# Patient Record
Sex: MALE | Race: WHITE
[De-identification: names, ages, dates, MRNs, and addresses within clinical notes are randomized per-mention and may not be internally consistent; named-entity substitution may affect disease eponyms.]

---

## 2018-03-08 ENCOUNTER — HOSPITAL ENCOUNTER (OUTPATIENT)
Dept: HOSPITAL 62 - END | Age: 78
Discharge: HOME | End: 2018-03-08
Attending: SURGERY
Payer: MEDICARE

## 2018-03-08 VITALS — SYSTOLIC BLOOD PRESSURE: 134 MMHG | DIASTOLIC BLOOD PRESSURE: 70 MMHG

## 2018-03-08 DIAGNOSIS — K63.5: ICD-10-CM

## 2018-03-08 DIAGNOSIS — D12.2: ICD-10-CM

## 2018-03-08 DIAGNOSIS — K57.30: ICD-10-CM

## 2018-03-08 DIAGNOSIS — K64.8: ICD-10-CM

## 2018-03-08 DIAGNOSIS — Z86.010: ICD-10-CM

## 2018-03-08 DIAGNOSIS — Z87.891: ICD-10-CM

## 2018-03-08 DIAGNOSIS — Z12.11: Primary | ICD-10-CM

## 2018-03-08 DIAGNOSIS — D12.0: ICD-10-CM

## 2018-03-08 DIAGNOSIS — R10.31: ICD-10-CM

## 2018-03-08 DIAGNOSIS — G89.29: ICD-10-CM

## 2018-03-08 DIAGNOSIS — I49.9: ICD-10-CM

## 2018-03-08 PROCEDURE — 45385 COLONOSCOPY W/LESION REMOVAL: CPT

## 2018-03-08 PROCEDURE — 0DBL8ZX EXCISION OF TRANSVERSE COLON, VIA NATURAL OR ARTIFICIAL OPENING ENDOSCOPIC, DIAGNOSTIC: ICD-10-PCS | Performed by: SURGERY

## 2018-03-08 PROCEDURE — 45380 COLONOSCOPY AND BIOPSY: CPT

## 2018-03-08 PROCEDURE — 0DBH8ZX EXCISION OF CECUM, VIA NATURAL OR ARTIFICIAL OPENING ENDOSCOPIC, DIAGNOSTIC: ICD-10-PCS | Performed by: SURGERY

## 2018-03-08 PROCEDURE — 88305 TISSUE EXAM BY PATHOLOGIST: CPT

## 2018-03-08 PROCEDURE — 0DBK8ZX EXCISION OF ASCENDING COLON, VIA NATURAL OR ARTIFICIAL OPENING ENDOSCOPIC, DIAGNOSTIC: ICD-10-PCS | Performed by: SURGERY

## 2018-03-08 RX ADMIN — MIDAZOLAM HYDROCHLORIDE ONE MG: 1 INJECTION, SOLUTION INTRAMUSCULAR; INTRAVENOUS at 07:49

## 2018-03-08 RX ADMIN — FENTANYL CITRATE ONE MCG: 50 INJECTION INTRAMUSCULAR; INTRAVENOUS at 07:39

## 2018-03-08 RX ADMIN — FENTANYL CITRATE ONE MCG: 50 INJECTION INTRAMUSCULAR; INTRAVENOUS at 07:36

## 2018-03-08 RX ADMIN — MIDAZOLAM HYDROCHLORIDE ONE MG: 1 INJECTION, SOLUTION INTRAMUSCULAR; INTRAVENOUS at 07:42

## 2018-03-08 RX ADMIN — MIDAZOLAM HYDROCHLORIDE ONE MG: 1 INJECTION, SOLUTION INTRAMUSCULAR; INTRAVENOUS at 07:35

## 2018-03-08 NOTE — OPERATIVE REPORT
Operative Report


DATE OF SURGERY: 03/08/18


PREOPERATIVE DIAGNOSIS: 1.  Sigmoid diverticulosis disease.  2.  History of 

colon polyps, remote


POSTOPERATIVE DIAGNOSIS: Same with.  1. sigmoid diverticulosis.  2. multiple 

colon polyps including the sending colon, cecum, and splenic flexure.  3.  

Internal hemorrhoids


OPERATION: 1.  Total colonoscopy to cecum with photodocumentation.  2.  Hot 

snare polypectomy of cecal and splenic flexure polyps.  3.  Piecemeal cold 

forceps biopsy removal of ascending colon polyp


SURGEON: JESSICA REDDY


ANESTHESIA: Moderate Sedation


TISSUE REMOVED OR ALTERED: Multiple polyps


COMPLICATIONS: 





None


ESTIMATED BLOOD LOSS: Scant


INTRAOPERATIVE FINDINGS: See below


PROCEDURE: 


Obtaining informed consent the patient was taken from the preoperative holding 

area to the main endoscopy suite where monitoring devices were attached to  the 

patient.  Plan and surgical timeout were conducted


 


The patient was placed in the left lateral decubitus  position with knees to 

chest.  A perianal examination was performed.  There was no visible or palpable 

anorectal pathology.  Sphincter tone was felt to be normal.


 


The flexible adult colonoscope was advanced through the anal rectal canal, all 

the way to the cecum.  Visualization of the cecum was achieved and the 

ileocecal valve, the appendiceal orifice and transillumination of the anterior 

abdominal wall.  This was an excellent study on the well-prepped bowel.  There 

is a small punctate 2 mm polyp in the cecum which was removed using a hot snare 

device, medium heat with retention of the specimen.  The colonoscope was 

withdrawn slowly and methodically checked and the mucosa carefully.  There was 

a polyp in the ascending colon, very narrow, sessile, blending in with the 

surrounding mucosa.  It was removed with the cold forceps biopsy in a piecemeal 

fashion and sent as a sending colon polyp.  At about the level of the splenic 

flexure was a sessile polyp with some elevation approximately 4 mm in diameter 

photographed and removed with a hot snare device medium heat strength, specimen 

retrieved and polypectomy site stable without bleeding.





There are moderate diverticular disease of the sigmoid colon.  The scope was 

slowly withdrawn through the anal rectal canal.  There were internal 

hemorrhoids nonthrombosed.  Complete visualization of the rectum was achieved 

with photodocumentation.


 


The scope was withdrawn to the patient's anus.  The patient tolerated the 

procedure well and was taken to the recovery area in stable condition.





Per surveillance guidelines, patient be an appropriate candidate for follow-up 

colonoscopy in 3 years, or sooner if symptoms develop

## 2018-03-08 NOTE — DISCHARGE SUMMARY
Discharge Summary (SDC)





- Discharge


Final Diagnosis: 





1.  Sigmoid diverticulosis





2.  Internal hemorrhoids





3.  Multiple colon polyps


Date of Surgery: 03/08/18


Discharge Date: 03/08/18


Condition: Good


Treatment or Instructions: 


               





                  [address] [address]


 Phone: [phone]    Fax:  [phone]








            


            POST ENDOSCOPY DISCHARGE INSTRUCTIONS








1.  Diet:  Start clear liquids that a regular diet as tolerated.





2.  Resume all preoperative medications.  All oral anticoagulants and aspirins 

can be resumed 24 hours after procedure.





3.  If a polypectomy was performed some bleeding per rectum may occur.  This 

should stop within 3 days.  If not, please contact the office.





4.  If you had a colonoscopy you may experience some bloating and delayed 

return of normal bowel function for several days, your regular bowel movement 

pattern should resume within a week.





5.  Please contact [facility] at [phone] to make an 

appointment with Dr. Herrear for 1 to 3 weeks following procedure.





6.  If you have any questions or concerns regarding your care,treatment plan or 

follow up, please contact our office.





7.  Per clinical guidelines we recommend you undergo a repeat colonoscopy in 3 

years.


Referrals: 


MICHELLE MARKHAM MD [Primary Care Provider] - 


Discharge Diet: As Tolerated


Discharge Activity: Activity As Tolerated


Home Care Assistance: None Needed


Report the Following to Your Physician Immediately: Shortness of Breath, 

Increase in Pain, Fever over 101 Degrees

## 2019-08-27 ENCOUNTER — HOSPITAL ENCOUNTER (OUTPATIENT)
Dept: HOSPITAL 62 - SC | Age: 79
Discharge: HOME | End: 2019-08-27
Attending: OPHTHALMOLOGY
Payer: MEDICARE

## 2019-08-27 DIAGNOSIS — H25.813: Primary | ICD-10-CM

## 2019-08-27 DIAGNOSIS — H43.813: ICD-10-CM

## 2019-08-27 DIAGNOSIS — I10: ICD-10-CM

## 2019-08-27 PROCEDURE — V2787 ASTIGMATISM-CORRECT FUNCTION: HCPCS

## 2019-08-27 PROCEDURE — 66984 XCAPSL CTRC RMVL W/O ECP: CPT

## 2019-08-27 RX ADMIN — HEPARIN SODIUM ONE ML: 1000 INJECTION, SOLUTION INTRAVENOUS; SUBCUTANEOUS at 10:09

## 2019-08-27 RX ADMIN — EPINEPHRINE ONE MG: 1 INJECTION, SOLUTION, CONCENTRATE INTRAVENOUS at 10:09

## 2019-08-27 RX ADMIN — TROPICAMIDE PRN DROP: 10 SOLUTION/ DROPS OPHTHALMIC at 09:34

## 2019-08-27 RX ADMIN — BESIFLOXACIN PRN DROP: 6 SUSPENSION OPHTHALMIC at 09:34

## 2019-08-27 RX ADMIN — SODIUM CHONDROITIN SULFATE / SODIUM HYALURONATE ONE EACH: 0.55-0.5 INJECTION INTRAOCULAR at 00:00

## 2019-08-27 RX ADMIN — DORZOLAMIDE HYDROCHLORIDE AND TIMOLOL MALEATE PRN DROP: 20; 5 SOLUTION OPHTHALMIC at 00:00

## 2019-08-27 RX ADMIN — EPINEPHRINE ONE MG: 1 INJECTION, SOLUTION, CONCENTRATE INTRAVENOUS at 00:00

## 2019-08-27 RX ADMIN — DORZOLAMIDE HYDROCHLORIDE AND TIMOLOL MALEATE PRN DROP: 20; 5 SOLUTION OPHTHALMIC at 10:23

## 2019-08-27 RX ADMIN — HEPARIN SODIUM ONE ML: 1000 INJECTION, SOLUTION INTRAVENOUS; SUBCUTANEOUS at 00:00

## 2019-08-27 RX ADMIN — TETRACAINE HYDROCHLORIDE PRN DROP: 5 SOLUTION OPHTHALMIC at 09:55

## 2019-08-27 RX ADMIN — CYCLOPENTOLATE HYDROCHLORIDE AND PHENYLEPHRINE HYDROCHLORIDE PRN DROP: 2; 10 SOLUTION/ DROPS OPHTHALMIC at 09:34

## 2019-08-27 RX ADMIN — BESIFLOXACIN PRN DROP: 6 SUSPENSION OPHTHALMIC at 00:00

## 2019-08-27 RX ADMIN — TROPICAMIDE PRN DROP: 10 SOLUTION/ DROPS OPHTHALMIC at 09:14

## 2019-08-27 RX ADMIN — CYCLOPENTOLATE HYDROCHLORIDE AND PHENYLEPHRINE HYDROCHLORIDE PRN DROP: 2; 10 SOLUTION/ DROPS OPHTHALMIC at 09:24

## 2019-08-27 RX ADMIN — BESIFLOXACIN PRN DROP: 6 SUSPENSION OPHTHALMIC at 10:23

## 2019-08-27 RX ADMIN — TETRACAINE HYDROCHLORIDE PRN DROP: 5 SOLUTION OPHTHALMIC at 09:34

## 2019-08-27 RX ADMIN — CYCLOPENTOLATE HYDROCHLORIDE AND PHENYLEPHRINE HYDROCHLORIDE PRN DROP: 2; 10 SOLUTION/ DROPS OPHTHALMIC at 09:14

## 2019-08-27 RX ADMIN — SODIUM CHONDROITIN SULFATE / SODIUM HYALURONATE ONE EACH: 0.55-0.5 INJECTION INTRAOCULAR at 10:09

## 2019-08-27 RX ADMIN — BESIFLOXACIN PRN DROP: 6 SUSPENSION OPHTHALMIC at 09:14

## 2019-08-27 RX ADMIN — TETRACAINE HYDROCHLORIDE PRN DROP: 5 SOLUTION OPHTHALMIC at 09:15

## 2019-08-27 RX ADMIN — TROPICAMIDE PRN DROP: 10 SOLUTION/ DROPS OPHTHALMIC at 09:24

## 2019-08-27 NOTE — OPERATIVE REPORT
Operative Report-[facility]


Operative Report: 





DATE OF SURGERY: 8/27/2019


PREOPERATIVE DIAGNOSIS: CATARACT, LEFT EYE.


POSTOPERATIVE DIAGNOSIS: CATARACT, LEFT EYE.


PROCEDURE PERFORMED: PHACOEMULSIFICATION WITH POSTERIOR CHAMBER INTRAOCULAR 

LENS, LEFT EYE.


Intraocular Lens Model : S N 6AT3 20.5


Total Phaco Time: []


SURGEON: LUISANA TAPIA MD


ANESTHESIA: TOPICAL WITH MAC.


INDICATIONS FOR SURGERY: Difficulty with night driving


PROCEDURE:


The patient was brought to the Operating Room and placed in a seated position. a

 lid speculum was placed in the eye. the 0.180, and 270 degree axis of the 

cornea was marked with a toric marker. the patien was place in a reclining 

position. Following tetracaine drops, topical anesthesia was administered. This 

consisted of instrument wipe pledgets soaked in a solution of 4% Xylocaine mixed

 with 0.75% Marcaine in a 1:2 ratio. A 2 x 1 cm pledget was placed in the 

superior fornix. A 1 x 1 cm pledget was placed in the inferior fornix. The eye 

was patched shut for 5 minutes. The patch was removed. The eye was sterilely 

prepped and draped in the usual manner. Lid speculum was placed in the eye. The 

pledgets were removed. 4-0 black silk sutures were placed around the superior 

and the inferior rectus muscles to be used as traction. A conjunctival peritomy 

was made at the 10 o'clock position. Hemostasis was obtained with bipolar 

cautery. A posterior limbal groove was created using a crescent knife and 

dissected anteriorly towards the cornea. A sharp point blade was used to create 

a paracentesis site at the 2 o'clock position. 0.2 cc non preserved Lidocaine 

was injected into the anterior chamber. A 2.4 mm keratome was used to enter the 

anterior chamber through the groove. Viscoelastic was injected into the 

anteriorchamber. An anterior capsulotomy was performed using Utrata forceps in 

acapsulorrhexis fashion. Hydrodissection and hydrodelineation were performed. 

Phacoemulsification was performed in divide-and-conquer technique.


Following this, the I/A unit was used to remove residual cortex. Viscoelastic 

was injected into the capsular bag. The Intraocular lens was placed in the 

capsular bag.  The 163 degree axis of the eye was marked using a toric marker 

and the previously marked sites as reference.  The lens was centered at this 

axis.  The I/A unit was used to remove residual viscoelastic. The wound was seen

 to be watertight under high and low pressure, and no sutures were placed. The 

intraocular lens was well centered. The pressure was adjusted in the eye to 

normal pressure. The 4-0 black silk sutures and lid speculum were removed. The 

eye was shielded after Besivance and Cosopt drops were placed. The patient 

tolerated the procedure well and was sent to the Recovery Room in good 

condition.

## 2019-09-17 ENCOUNTER — HOSPITAL ENCOUNTER (OUTPATIENT)
Dept: HOSPITAL 62 - SC | Age: 79
Discharge: HOME | End: 2019-09-17
Attending: OPHTHALMOLOGY
Payer: MEDICARE

## 2019-09-17 DIAGNOSIS — H25.811: Primary | ICD-10-CM

## 2019-09-17 DIAGNOSIS — Z79.899: ICD-10-CM

## 2019-09-17 DIAGNOSIS — I10: ICD-10-CM

## 2019-09-17 DIAGNOSIS — Z96.1: ICD-10-CM

## 2019-09-17 PROCEDURE — V2787 ASTIGMATISM-CORRECT FUNCTION: HCPCS

## 2019-09-17 PROCEDURE — 66984 XCAPSL CTRC RMVL W/O ECP: CPT

## 2019-09-17 PROCEDURE — 00142 ANES PX ON EYE LENS SURGERY: CPT

## 2019-09-17 RX ADMIN — BESIFLOXACIN PRN DROP: 6 SUSPENSION OPHTHALMIC at 11:21

## 2019-09-17 RX ADMIN — DORZOLAMIDE HYDROCHLORIDE AND TIMOLOL MALEATE PRN DROP: 20; 5 SOLUTION OPHTHALMIC at 11:09

## 2019-09-17 RX ADMIN — BESIFLOXACIN PRN DROP: 6 SUSPENSION OPHTHALMIC at 11:09

## 2019-09-17 RX ADMIN — BESIFLOXACIN PRN DROP: 6 SUSPENSION OPHTHALMIC at 10:18

## 2019-09-17 RX ADMIN — TROPICAMIDE PRN DROP: 10 SOLUTION/ DROPS OPHTHALMIC at 10:28

## 2019-09-17 RX ADMIN — TETRACAINE HYDROCHLORIDE PRN DROP: 5 SOLUTION OPHTHALMIC at 10:29

## 2019-09-17 RX ADMIN — CYCLOPENTOLATE HYDROCHLORIDE AND PHENYLEPHRINE HYDROCHLORIDE PRN DROP: 2; 10 SOLUTION/ DROPS OPHTHALMIC at 10:18

## 2019-09-17 RX ADMIN — TROPICAMIDE PRN DROP: 10 SOLUTION/ DROPS OPHTHALMIC at 10:04

## 2019-09-17 RX ADMIN — BESIFLOXACIN PRN DROP: 6 SUSPENSION OPHTHALMIC at 10:05

## 2019-09-17 RX ADMIN — TROPICAMIDE PRN DROP: 10 SOLUTION/ DROPS OPHTHALMIC at 10:18

## 2019-09-17 RX ADMIN — TETRACAINE HYDROCHLORIDE PRN DROP: 5 SOLUTION OPHTHALMIC at 10:06

## 2019-09-17 RX ADMIN — DORZOLAMIDE HYDROCHLORIDE AND TIMOLOL MALEATE PRN DROP: 20; 5 SOLUTION OPHTHALMIC at 11:21

## 2019-09-17 RX ADMIN — CYCLOPENTOLATE HYDROCHLORIDE AND PHENYLEPHRINE HYDROCHLORIDE PRN DROP: 2; 10 SOLUTION/ DROPS OPHTHALMIC at 10:28

## 2019-09-17 RX ADMIN — CYCLOPENTOLATE HYDROCHLORIDE AND PHENYLEPHRINE HYDROCHLORIDE PRN DROP: 2; 10 SOLUTION/ DROPS OPHTHALMIC at 10:04

## 2019-09-17 NOTE — OPERATIVE REPORT
Operative Report[facility]


Operative Report: 





 


DATE OF SURGERY: 9/17/2019


 


PREOPERATIVE DIAGNOSIS: CATARACT, RIGHT EYE.


 


POSTOPERATIVE DIAGNOSIS: CATARACT,RIGHT EYE.


 


PROCEDURE PERFORMED: PHACOEMULSIFICATION WITH POSTERIOR CHAMBER INTRAOCULAR 

LENS, RIGHT EYE.


 


Intraocular Lens Model : SN 6AT5 20.5


 


Total Phaco Time: 44 seconds


 


SURGEON: LUISANA TAPIA MD


 


ANESTHESIA: TOPICAL WITH MAC.


 


INDICATIONS FOR SURGERY: Difficulty with night driving


 


PROCEDURE:


The patient was brought to the Operating Room and placed in a seated position. a

lid speculum was placed in the eye. the 0.180, and 270 degree axis of the cornea

was marked with a toric marker. the patien was place in a reclining position. 

Following tetracaine drops, topical anesthesia was administered. This consisted 

of instrument wipe pledgets soaked in a solution of 4% Xylocaine mixed with 

0.75% Marcaine in a 1:2 ratio. A 2 x 1 cm pledget was placed in the superior 

fornix. A 1 x 1 cm pledget was placed in the inferior fornix. The eye was 

patched shut for 5 minutes. The patch was removed. The eye was sterilely prepped

and draped in the usual manner. Lid speculum was placed in the eye. The pledgets

were removed. 4-0 black silk sutures were placed around the superior and the 

inferior rectus muscles to be used as traction. A conjunctival peritomy was made

at the 10 o'clock position. Hemostasis was obtained with bipolar cautery. A 

posterior limbal groove was created using a crescent knife and dissected 

anteriorly towards the cornea. A sharp point blade was used to create a 

paracentesis site at the 2 o'clock position. 0.2 cc non preserved Lidocaine was 

injected into the anterior chamber. A 2.4 mm keratome was used to enter the 

anterior chamber through the groove. Viscoelastic was injected into the 

anteriorchamber. An anterior capsulotomy was performed using Utrata forceps in 

acapsulorrhexis fashion. Hydrodissection and hydrodelineation were performed. 

Phacoemulsification was performed in divide-and-conquer technique.


Following this, the I/A unit was used to remove residual cortex. Viscoelastic 

was injected into the capsular bag. The Intraocular lens was placed in the 

capsular bag.  The 7 degree axis of the eye was marked using a toric marker and 

the previously marked sites as reference.  The lens was centered at this axis.  

The I/A unit was used to remove residual viscoelastic. The wound was seen to be 

watertight under high and low pressure, and no sutures were placed. The 

intraocular lens was well centered. The pressure was adjusted in the eye to 

normal pressure. The 4-0 black silk sutures and lid speculum were removed. The 

eye was shielded after Besivance and Cosopt drops were placed. The patient 

tolerated the procedure well and was sent to the Recovery Room in good 

condition.

## 2020-06-20 NOTE — PDOC DISCHARGE SUMMARY
Discharge Summary[facility]


Discharge Summary: 





DATE: 8/27/2019


FINAL DIAGNOSIS: CATARACT, LEFT EYE


PROCEDURE: Cataract extraction with toric intraocular lens implant left eye


HOSPITAL COURSE:


The patient will be discharged to home. The patient is instructed to resume 

preoperative medications, take Tylenol as needed for discomfort, to keep the eye

shielded, They should use the prescribed antibiotic, NSAID, and steroid at 3 PM 

and 8 PM, and to follow up in my office in 1 day. 10

## 2020-07-29 ENCOUNTER — HOSPITAL ENCOUNTER (OUTPATIENT)
Dept: HOSPITAL 62 - ER | Age: 80
Setting detail: OBSERVATION
LOS: 1 days | Discharge: HOME | End: 2020-07-30
Attending: INTERNAL MEDICINE | Admitting: INTERNAL MEDICINE
Payer: MEDICARE

## 2020-07-29 DIAGNOSIS — Z87.891: ICD-10-CM

## 2020-07-29 DIAGNOSIS — R29.701: ICD-10-CM

## 2020-07-29 DIAGNOSIS — Z85.828: ICD-10-CM

## 2020-07-29 DIAGNOSIS — Z82.49: ICD-10-CM

## 2020-07-29 DIAGNOSIS — I44.0: ICD-10-CM

## 2020-07-29 DIAGNOSIS — R27.0: ICD-10-CM

## 2020-07-29 DIAGNOSIS — I63.89: Primary | ICD-10-CM

## 2020-07-29 DIAGNOSIS — I47.1: ICD-10-CM

## 2020-07-29 DIAGNOSIS — I10: ICD-10-CM

## 2020-07-29 DIAGNOSIS — Z79.899: ICD-10-CM

## 2020-07-29 LAB
ADD MANUAL DIFF: NO
ALBUMIN SERPL-MCNC: 4.3 G/DL (ref 3.5–5)
ALP SERPL-CCNC: 82 U/L (ref 38–126)
ANION GAP SERPL CALC-SCNC: 7 MMOL/L (ref 5–19)
APPEARANCE UR: (no result)
APTT BLD: 28.2 SEC (ref 23.5–35.8)
APTT PPP: (no result) S
AST SERPL-CCNC: 22 U/L (ref 17–59)
BASOPHILS # BLD AUTO: 0 10^3/UL (ref 0–0.2)
BASOPHILS NFR BLD AUTO: 0.7 % (ref 0–2)
BILIRUB DIRECT SERPL-MCNC: 0 MG/DL (ref 0–0.4)
BILIRUB SERPL-MCNC: 1 MG/DL (ref 0.2–1.3)
BILIRUB UR QL STRIP: NEGATIVE
BUN SERPL-MCNC: 25 MG/DL (ref 7–20)
CALCIUM: 9.8 MG/DL (ref 8.4–10.2)
CHLORIDE SERPL-SCNC: 105 MMOL/L (ref 98–107)
CK SERPL-CCNC: 89 U/L (ref 55–170)
CO2 SERPL-SCNC: 25 MMOL/L (ref 22–30)
EOSINOPHIL # BLD AUTO: 0.1 10^3/UL (ref 0–0.6)
EOSINOPHIL NFR BLD AUTO: 2.8 % (ref 0–6)
ERYTHROCYTE [DISTWIDTH] IN BLOOD BY AUTOMATED COUNT: 14.6 % (ref 11.5–14)
GLUCOSE SERPL-MCNC: 112 MG/DL (ref 75–110)
GLUCOSE UR STRIP-MCNC: NEGATIVE MG/DL
HCT VFR BLD CALC: 48.2 % (ref 37.9–51)
HGB BLD-MCNC: 16.2 G/DL (ref 13.5–17)
INR PPP: 1.05
KETONES UR STRIP-MCNC: NEGATIVE MG/DL
LYMPHOCYTES # BLD AUTO: 1.1 10^3/UL (ref 0.5–4.7)
LYMPHOCYTES NFR BLD AUTO: 25.2 % (ref 13–45)
MCH RBC QN AUTO: 28.8 PG (ref 27–33.4)
MCHC RBC AUTO-ENTMCNC: 33.6 G/DL (ref 32–36)
MCV RBC AUTO: 86 FL (ref 80–97)
MONOCYTES # BLD AUTO: 0.6 10^3/UL (ref 0.1–1.4)
MONOCYTES NFR BLD AUTO: 12.2 % (ref 3–13)
NEUTROPHILS # BLD AUTO: 2.7 10^3/UL (ref 1.7–8.2)
NEUTS SEG NFR BLD AUTO: 59.1 % (ref 42–78)
NITRITE UR QL STRIP: NEGATIVE
PH UR STRIP: 6 [PH] (ref 5–9)
PLATELET # BLD: 95 10^3/UL (ref 150–450)
POTASSIUM SERPL-SCNC: 4.2 MMOL/L (ref 3.6–5)
PROT SERPL-MCNC: 7.2 G/DL (ref 6.3–8.2)
PROT UR STRIP-MCNC: NEGATIVE MG/DL
PROTHROMBIN TIME: 13.7 SEC (ref 11.4–15.4)
RBC # BLD AUTO: 5.62 10^6/UL (ref 4.35–5.55)
SP GR UR STRIP: 1.01
TOTAL CELLS COUNTED % (AUTO): 100 %
UROBILINOGEN UR-MCNC: NEGATIVE MG/DL (ref ?–2)
WBC # BLD AUTO: 4.5 10^3/UL (ref 4–10.5)

## 2020-07-29 PROCEDURE — 70450 CT HEAD/BRAIN W/O DYE: CPT

## 2020-07-29 PROCEDURE — 93010 ELECTROCARDIOGRAM REPORT: CPT

## 2020-07-29 PROCEDURE — 97112 NEUROMUSCULAR REEDUCATION: CPT

## 2020-07-29 PROCEDURE — 85610 PROTHROMBIN TIME: CPT

## 2020-07-29 PROCEDURE — 85730 THROMBOPLASTIN TIME PARTIAL: CPT

## 2020-07-29 PROCEDURE — 81001 URINALYSIS AUTO W/SCOPE: CPT

## 2020-07-29 PROCEDURE — 97530 THERAPEUTIC ACTIVITIES: CPT

## 2020-07-29 PROCEDURE — 93005 ELECTROCARDIOGRAM TRACING: CPT

## 2020-07-29 PROCEDURE — 85025 COMPLETE CBC W/AUTO DIFF WBC: CPT

## 2020-07-29 PROCEDURE — 83036 HEMOGLOBIN GLYCOSYLATED A1C: CPT

## 2020-07-29 PROCEDURE — 97165 OT EVAL LOW COMPLEX 30 MIN: CPT

## 2020-07-29 PROCEDURE — 80053 COMPREHEN METABOLIC PANEL: CPT

## 2020-07-29 PROCEDURE — 93880 EXTRACRANIAL BILAT STUDY: CPT

## 2020-07-29 PROCEDURE — 97116 GAIT TRAINING THERAPY: CPT

## 2020-07-29 PROCEDURE — 71045 X-RAY EXAM CHEST 1 VIEW: CPT

## 2020-07-29 PROCEDURE — 93306 TTE W/DOPPLER COMPLETE: CPT

## 2020-07-29 PROCEDURE — 82550 ASSAY OF CK (CPK): CPT

## 2020-07-29 PROCEDURE — 36415 COLL VENOUS BLD VENIPUNCTURE: CPT

## 2020-07-29 PROCEDURE — 84484 ASSAY OF TROPONIN QUANT: CPT

## 2020-07-29 PROCEDURE — 80061 LIPID PANEL: CPT

## 2020-07-29 PROCEDURE — 84443 ASSAY THYROID STIM HORMONE: CPT

## 2020-07-29 PROCEDURE — 99291 CRITICAL CARE FIRST HOUR: CPT

## 2020-07-29 PROCEDURE — 97161 PT EVAL LOW COMPLEX 20 MIN: CPT

## 2020-07-29 PROCEDURE — 70551 MRI BRAIN STEM W/O DYE: CPT

## 2020-07-29 RX ADMIN — Medication SCH: at 21:19

## 2020-07-29 RX ADMIN — FAMOTIDINE SCH: 20 TABLET, FILM COATED ORAL at 21:19

## 2020-07-29 NOTE — XCELERA REPORT
[address] [address]

                               Tel: [phone]

                               Fax: [phone]



                      Transthoracic Echocardiogram Report

_______________________________________________________________________________



Name: YOUNG ODONNELL

MRN: [id number]                           Age: 80 yrs

Gender: Male                              : 1940

Patient Status: Inpatient                 Patient Location: [facility]^A

Account #: [id number]

Study Date: 2020 08:04 PM

Accession #: [id number]

_______________________________________________________________________________



Height: 68 in        Weight: 180 lb        BSA: 2.0 m2

_______________________________________________________________________________

Procedure: A two-dimensional transthoracic echocardiogram with color flow and

Doppler was performed. Study Quality: Fair.

Reason For Study: cva



History: CVA.

Ordering Physician: NENO BAINS



Performed By: Oriana Alvarado

_______________________________________________________________________________



Interpretation Summary

The left ventricle is normal in size.

There is normal left ventricular wall thickness.

LV EF is 65% to 70%

Left ventricular systolic function is normal.

Doppler measurements suggest impaired left ventricular relaxation, which is

associated with grade I/IV or mild diastolic dysfunction

The left ventricular wall motion is normal.

There is no thrombus.

No ASD ,VSD , or PFO seen.

The right ventricle is normal in size and function.

The right atrium is normal.

The left atrial size is normal.

There is no evidence of mitral valve prolapse.

There is no vegetation seen on the mitral valve.

There is no mitral valve stenosis.

There is a trace amount of mitral regurgitation

There is no aortic valvular vegetation.

There is no aortic valve stenosis

There is aortic sclerosis without aortic stenosis.

There is no LVOT obstruction.

There is a mild amount of aortic regurgitation

There is no tricuspid stenosis.

There is a mild amount of tricuspid regurgitation

Right ventricular systolic pressure is normal.

RVSP is 20 to 25 mm of Hg , with RA mean of 5 to 10.

There is no pulmonic valvular stenosis.

There is a trace amount of pulmonic regurgitation

The aortic root is normal size.

The inferior vena cava appeared normal and decreased > 50% with respiration

(RAP 5-10 mmHg)

There is no pericardial effusion.



MMode/2D Measurements & Calculations

RVDd: 2.1 cm        LVIDd: 5.5 cm      FS: 41.5 %         Ao root diam: 3.4 cm



IVSd: 0.92 cm       LVIDs: 3.2 cm      EDV(Teich):        Ao root area:

                    LVPWd: 0.89 cm     147.6 ml           9.3 cm2

                                       ESV(Teich): 41.5 mlLA dimension: 2.9 cm

                                       EF(Teich): 71.9 %

        _______________________________________________________________

LVLd ap4: 7.4 cm    SV(MOD-sp4):

EDV(MOD-sp4):       70.0 ml

98.0 ml

LVLs ap4: 5.8 cm

ESV(MOD-sp4):

28.0 ml

EF(MOD-sp4): 71.4 %



Doppler Measurements & Calculations

MV E max louis:      MV P1/2t max louis:    Ao V2 max:        AI max louis:

80.6 cm/sec        92.9 cm/sec          132.3 cm/sec      231.4 cm/sec



MV A max louis:      MV P1/2t: 100.2 msec Ao max PG:        AI max P.0 cm/sec        MVA(P1/2t): 2.2 cm2  7.0 mmHg          21.4 mmHg

MV E/A: 0.94       MV dec slope:                          AI dec slope:

                   271.5 cm/sec2                          114.3 cm/sec2

                   MV dec time:                           AI P1/2t:

                   0.23 sec                               593.2 msec



        _______________________________________________________________

LV V1 max PG:      PA V2 max:           PI end-d louis:     TR max louis:

3.4 mmHg           72.1 cm/sec          145.1 cm/sec      194.1 cm/sec

LV V1 max:         PA max P.1 mmHg                    TR max P.5 cm/sec                                               15.1 mmHg

        _______________________________________________________________

AV P1/2t-pr_phl:   MV P1/2t-pr_phl:

593.2 msec         100.2 msec





Left Ventricle

The left ventricle is normal in size. There is normal left ventricular wall

thickness. LV EF is 65% to 70%. Left ventricular systolic function is normal.

Doppler measurements suggest impaired left ventricular relaxation, which is

associated with grade I/IV or mild diastolic dysfunction. The left ventricular

wall motion is normal. There is no thrombus. No ASD ,VSD , or PFO seen.



Right Ventricle

The right ventricle is normal in size and function.



Atria

The right atrium is normal. The left atrial size is normal.



Mitral Valve

There is no evidence of mitral valve prolapse. There is no vegetation seen on

the mitral valve. There is no mitral valve stenosis. There is a trace amount

of mitral regurgitation.





Aortic Valve

There is no aortic valvular vegetation. There is no aortic valve stenosis.

There is aortic sclerosis without aortic stenosis. There is no LVOT

obstruction. There is a mild amount of aortic regurgitation.



Tricuspid Valve

There is no tricuspid stenosis. There is a mild amount of tricuspid

regurgitation. Right ventricular systolic pressure is normal. RVSP is 20 to 25

mm of Hg , with RA mean of 5 to 10.



Pulmonic Valve

There is no pulmonic valvular stenosis. There is a trace amount of pulmonic

regurgitation.



Great Vessels

The aortic root is normal size. The inferior vena cava appeared normal and

decreased > 50% with respiration (RAP 5-10 mmHg).





Effusions

There is no pericardial effusion.



_______________________________________________________________________________



_______________________________________________________________________________

Electronically signed by:      Sammi Jarvis      on 2020 10:50 PM



CC: NENO BAINS Lakshmi

## 2020-07-29 NOTE — ER DOCUMENT REPORT
Entered by ROSITA WILLSON SCRIBE  07/29/20 0902 





Acting as scribe for:JESSICA HARMAN MD





ED Neuro Symptoms/Deficit





- General


Chief Complaint: Arm Pain


Stated Complaint: ARM PAIN


Time Seen by Provider: 07/29/20 08:26


Mode of Arrival: Medic


Information source: Patient


Notes: 





This 80-year-old male patient presents to the emergency department today 

complaining that his right arm feels "clumsy" which he states began between 6:00

AM and 6:30 AM this morning.  Patient states he woke up at 3:00 AM this morning 

but this sensation was not present at that time.  Patient states when the 

sensation began he was sitting in a chair.  In an attempt to further clarify 

what the patient meant by "clumsy" he reports that it was "not numb, just felt 

funny".  Wife at bedside mentions that he was unable to sign his name for EMS.  

Eventually patient is able to state that he is having difficulty with fine motor

skills.





TRAVEL OUTSIDE OF THE [place] IN LAST 30 DAYS: No





- Related Data


Allergies/Adverse Reactions: 


                                        





No Known Allergies Allergy (Verified 08/27/19 09:18)


   











Past Medical History





- General


Information source: Patient, [facility] Records





- Social History


Smoking Status: Former Smoker


Cigarette use (# per day): No


Frequency of alcohol use: None


Drug Abuse: None


Occupation: retired


Lives with: Spouse/Significant other


Family History: Hypertension





- Past Medical History


Cardiac Medical History: Reports: Hx Hypertension - CONTROLLED/MEDICATED


Malignancy Medical History: Reports Hx Skin Cancer - Squamous cell


Past Surgical History: Reports: Other - Multiple squamous cell lesions removed. 

Bilateral cataract repair.





- Immunizations


Hx Diphtheria, Pertussis, Tetanus Vaccination: No - UNK


Hx Pneumococcal Vaccination: 01/01/11





Review of Systems





- Review of Systems


Constitutional: No symptoms reported


EENT: No symptoms reported


Cardiovascular: No symptoms reported


Respiratory: No symptoms reported


Gastrointestinal: No symptoms reported


Genitourinary: No symptoms reported


Male Genitourinary: No symptoms reported


Musculoskeletal: No symptoms reported


Skin: No symptoms reported


Hematologic/Lymphatic: No symptoms reported


Neurological/Psychological: Other - right arm "felt funny" which he further 

describes as difficulty with fine motor skills.  denies: Numbness, Tingling


-: Yes All other systems reviewed and negative





Physical Exam





- Vital signs


Vitals: 


                                        











Temp BP Pulse Ox


 


 98.1 F   142/69 H  96 


 


 07/29/20 08:26  07/29/20 08:26  07/29/20 08:26














- Notes


Notes: 





Physical Exam:


 


General: Alert, appears well. 


 


HEENT: Normocephalic. Atraumatic. PERRL. Extraocular movements intact. 

Oropharynx clear.


 


Neck: Supple. Non-tender.  No carotid bruits.


 


Respiratory: No respiratory distress. Clear and equal breath sounds bilaterally.


 


Cardiovascular: Regular rate and rhythm. 


 


Abdominal: Normal Inspection. Non-tender. No distension. Normal Bowel Sounds. 


 


Back: No gross abnormalities. 


 


Extremities: Moves all four extremities.


Upper extremities: Normal inspection. Normal ROM.  


Lower extremities: Normal inspection. No edema. Normal ROM.


 


Neurological: Normal cognition. Normal speech.  Finger-to-nose test intact on 

the left.  Past-pointing with finger-to-nose test on the right side. 


                  Cranial nerves II through XII were intact with normal eye 

motion, normal tongue protrusion, symmetrical smiling.  There was no weakness to

the lower extremities, or ataxia.  There was no weakness to the upper 

extremities, although there was very slight ataxia to the right upper extremity.

 There was no sensory deficit to any of the extremities.





Psychological: Normal affect. Normal Mood. 


 


Skin: Warm. Dry. Normal color.





Course





- Re-evaluation


Re-evalutation: 





07/29/20 12:06


The patient's only neurologic symptom was the loss of fine coordination to his 

right upper extremity.  The MRI showed acute or subacute injury in the left 

anterior cerebral artery distribution.  There was also suggestion of old infarct

in the left side.





After the patient returned from MRI, he was reevaluated and he stated that it 

felt like his hand coordination was improving and was not quite back to normal 

but was better than it had been previously.








The MRI findings, were discussed with the patient and his spouse.  The risk 

benefits of TPA were discussed including up to 6% chance of intracranial 

hemorrhage.  Given the minimal symptoms he still is having, and the amount of 

improvement that has occurred, the patient and spouse agreed to not use TPA.





07/29/20 12:15


After the admission was started, the wife informed the nurse that they had 

spoken with their cardiologist at [facility] and wanted to be transferred to 

[facility].  I called the [facility] transfer line and informed them of 

the wife requests.  They were going to contact the patient's cardiologist for 

clarification and get back to me.


07/29/20 14:02


Dr. Marquez from [facility] called back and discussed case.  She felt it 

was a lateral move in there with nothing they could offer her that was not 

available at this facility.  Additionally there were several transfers ahead of 

this one that had not received bed assignment yet.  Due to all of these issues, 

she declined the transfer.





- Vital Signs


Vital signs: 


                                        











Temp Pulse Resp BP Pulse Ox


 


 98.1 F   54 L  15   136/52 H  98 


 


 07/29/20 08:26  07/29/20 17:31  07/29/20 17:31  07/29/20 17:31  07/29/20 17:31














- Laboratory


Result Diagrams: 


                                 07/29/20 08:34





                                 07/29/20 08:34


Laboratory results interpreted by me: 


                                        











  07/29/20 07/29/20





  08:34 08:34


 


RBC  5.62 H 


 


RDW  14.6 H 


 


Plt Count  95 L 


 


BUN   25 H


 


Est GFR (MDRD) Non-Af   57 L


 


Glucose   112 H














- Diagnostic Test


Radiology reviewed: Image reviewed, Reports reviewed - CT scan of the head shows

subtle areas of hypoattenuation involving the left basal ganglia and insula 

suggestive of infarct.  There is a questionable dense left MCA sign.  Consider 

MRI for further characterization. MRI of the head showed a small foci of 

restricted diffusion within the subcortical white matter of the left parieto-

occipital parenchyma consistent with acute to subacute ischemic injury in the 

left anterior cerebral artery territory.  The left precentral gyrus subcortical 

T2 prolongation is consistent with sequelae of previous ischemic injury.  

Otherwise normal appearance of the brain.





- EKG Interpretation by Me


EKG shows normal: Sinus rhythm, Axis, Intervals, QRS Complexes, ST-T Waves


Rate: Normal - 57


Heart block present: 1st Degree


When compared to previous EKG there are: No significant change





- Consults


  ** Dr. King


Consulted provider: will come to ER - IMCU admit, Plavix and Aspirin





Critical Care Note





- Critical Care Note


Total time excluding time spent on procedures (mins): 40


Comments: 





At least 40 minutes spent evaluating the patient and multiple repeat 

examinations.  Reviewing lab work, past medical history, CT scan and then MRI 

recommendations.  Reviewing MRI scan results and discussing with the on-call 

hospitalist on 2 separate occasions.


Time spent discussing the case with the family and the risks and benefits of 

TPA.  Later considerable time spent on admission/management of the patient as 

they were requesting to go to [facility], and were inferring that his 

cardiologist wanted him transferred there.  This turned out not to be true.  I 

did discuss the case with the hospitalist at [facility] and she declined to 

accept a lateral transfer for management that could be provided at this 

facility.





Discharge





- Discharge


Clinical Impression: 


 Acute CVA (cerebrovascular accident)





Condition: Stable


Disposition: ADMITTED AS INPATIENT


Admitting Provider: Christine (Hospitalist)


Unit Admitted: IMCU





I personally performed the services described in the documentation, reviewed and

edited the documentation which was dictated to the scribe in my presence, and it

accurately records my words and actions.

## 2020-07-29 NOTE — RADIOLOGY REPORT (SQ)
EXAM DESCRIPTION:  CHEST SINGLE VIEW



IMAGES COMPLETED DATE/TIME:  7/29/2020 8:55 am



REASON FOR STUDY:  stroke workup



COMPARISON:  None.



EXAM PARAMETERS:  NUMBER OF VIEWS: One view.

TECHNIQUE: Single frontal radiographic view of the chest acquired.

RADIATION DOSE: NA

LIMITATIONS: None.



FINDINGS:  LUNGS AND PLEURA: No opacities, masses or pneumothorax. No pleural effusion.

MEDIASTINUM AND HILAR STRUCTURES: No masses.  Contour normal.

HEART AND VASCULAR STRUCTURES: Heart normal in size.  Normal vasculature.

BONES: No acute findings.

HARDWARE: None in the chest.

OTHER: No other significant finding.



IMPRESSION:  NO ACUTE RADIOGRAPHIC FINDING IN THE CHEST.



TECHNICAL DOCUMENTATION:  JOB ID:  [id number]

 2011 [employer]- All Rights Reserved



Reading location - IP/workstation name: [email or web address]

## 2020-07-29 NOTE — RADIOLOGY REPORT (SQ)
EXAM DESCRIPTION:  CT HEAD WITHOUT



IMAGES COMPLETED DATE/TIME:  7/29/2020 8:48 am



REASON FOR STUDY:  right arm clumbsy



COMPARISON:  None.



TECHNIQUE:  Axial images acquired through the brain without intravenous contrast.  Images reviewed wi
th bone, brain and subdural windows.  Additional sagittal and coronal reconstructions were generated.
 Images stored on PACS.

All CT scanners at this facility use dose modulation, iterative reconstruction, and/or weight based d
osing when appropriate to reduce radiation dose to as low as reasonably achievable (ALARA).

[facility]: Dose Right  [facility]: CareDose    [facility]: Dose Right    [facility]: Teradose 4D    [facility]: Smart Technologies



RADIATION DOSE:  CT Rad equipment meets quality standard of care and radiation dose reduction techniq
ues were employed. CTDIvol: 48.6 mGy. DLP: 930 mGy-cm. mGy.



LIMITATIONS:  None.



FINDINGS:  VENTRICLES: Normal size and contour.

CEREBRUM: No intracranial hemorrhage.  Subtle hypoattenuation within the left basal ganglia and insul
a.  More focal areas of hypodensity within the left caudate head and thalamus.  No significant mass e
ffect or midline shift. Remaining gray-white differentiation is preserved.  There is questionable hyp
erdense left MCA sign

CEREBELLUM: No masses.  No hemorrhage.  No alteration of density.  No evidence for acute infarction.

EXTRAAXIAL SPACES: No fluid collections.  No masses.

ORBITS AND GLOBE: No intra- or extraconal masses.  Normal contour of globe without masses.

CALVARIUM: No fracture.

PARANASAL SINUSES:  Minimal mucosal thickening within the ethmoid air cells.  No fluid or mucosal thi
ckening.

SOFT TISSUES: No mass or hematoma.

OTHER: No other significant finding.



IMPRESSION:  Subtle areas of hypoattenuation involving the left basal ganglia and insula suggestive o
f infarct.  Questionable dense left MCA sign.  Consider MRI for further characterization.

EVIDENCE OF ACUTE STROKE: YES.  LEFT MCA.



COMMENT:   Pertinent positive or negative findings of the imaging study reported as a CRITICAL EXAM t
sandra HARMAN MD at08:56 on 7/29/2020.

Category of Critical Exam: Age-indeterminate infarct.

Quality ID # [id number]: Final reports with documentation of one or more dose reduction techniques (e.g., Au
tomated exposure control, adjustment of the mA and/or kV according to patient size, use of iterative 
reconstruction technique)



TECHNICAL DOCUMENTATION:  JOB ID:  [id number]

 [employer]- All Rights Reserved



Reading location - IP/workstation name: [email or web address]

## 2020-07-29 NOTE — ER DOCUMENT REPORT
ED Alteplase Inc/Exc Criteria





- Inclusion Criteria:


1: Patient presented to ED within 3 hours of acute ischemic stroke symptom 

onset?


-: Yes


2: Did baseline CT exclude intracranial hemorrhage and/or other risk factors?


-: Yes


3: Is the age of the patient 18 years of age or greater?


-: Yes


*****: If any of the above questions are answered "NO" then stop, patient is not

a candidate for Alteplase,


*****: If all of the above questions are answered "YES" then continue with 

Exclusion Criteria.





- Exclusion Criteria:


1: Is there evidence of intracranial hemorrhage on baseline CT?


-: No


2: Is there suspicion of subarachnoid hemorrhage (even if CT negative)?


-: No


3: Is there a history of serious head trauma, recent previous stroke or MI 

within 3 months?


-: No


4: Does the patient have a clinical presentation consistent with MI or post-MI 

pericarditis?


-: No


5: Is there history of intracranial hemorrhage?


-: No


6: On repeated measurement is Systolic BP greater than 185mmHg or Diastolic BP 

greater that 110 mmHg and is aggressive treatment needed to reduce blood 

pressure to these limits (e.g. constant infusion of an anti-hypertensive)?


-: No


7: Did the patient awake with stroke symptoms?


-: No


8: Has the patient had a lumbar puncture or an arterial puncture at a non-

compressile site within 7 days?


-: No


9: With in the last 14 days did the patient have surgery or major trauma?


-: No


10: Is the patient pregnant or postpartum less than 2 weeks?


-: No


11: Was there any active bleeding or acute trauma?


-: No


12: Does the patient have intracranial neoplasm, arteriovenous malformation or 

aneurysm?


-: No


13: Does the patient have abnormal glucose (less than 50 or greater than 

400mg/dl)?  Record glucose in Comment.


-: No


14: Patient has rapidly improving symptoms at the time Alteplase is to be 

Administered.


-: Yes


15: Does the patient have any risks for bleeding, including but not limited to:


a.: Current use of Coumadin with PT greater than 15 seconds or INR greater than 

1.7.


b.: Current use of Pradaxa (Dabigatran).


c.: Heparin administereed within the past 48 hours and PTT elevated.


d.: Platelet count less than 100,000/mm.


e.: Major surgery or serious trauma within 14 days.


f.: Gastrointestinal or gynecological urinary bleeding within 14 days.


g.: Myocardial Infarction (MI) within 3 months.


-: No


*****: If the answer to any of the above questions is "YES" then stop, the 

patient is not a candidate for Alteplase.


*****: If the answer to all of the above questions is "NO" then the patient may 

be eligible for the Administration of Alteplase.


*****: If the patient is noted to have seizure activity at onset of Stroke 

symptoms; Consult Neurologist for further evaluation.





- The patient is:


-: Included and is eligible to receive Alteplase.  *Initiate bed placement at 

higher level of care*


--: No


Reviewed risks & benefits of thrombolytic therapy: I have reviewed the risks and

benefits of thrombolytic therapy with the patient and/or his/her family.


Yes


-: Excluded and not eligible to receive Alteplase for the above exclusions.


--: Yes


-: Excluded and not eligible to receive Alteplase for other reasons (specify in 

comments):





- Diagnosis of TIA:


-: Patient presented with transient symptoms that are now resolved and no other 

neurologic findings are currently present. List symptoms in comments.


-: Patient is NOT a candidate for tPA.


-:  ____(put name in comment)______ has been consulted for admission and 

continued evaluation of risk factor assessment.

## 2020-07-29 NOTE — PDOC H&P
History of Present Illness


Admission Date/PCP: 


  07/29/20 11:44





  MICHELLE MARKHAM MD





Patient complains of: Discoordination of right hand


History of Present Illness: 


YOUNG ODONNELL is a 80 year old male with a history of hypertension and " 

irregular heart rhythm", who presents to the hospital with complaints of right-

hand ataxia which started this morning around 6 AM.  He had no other symptoms 

besides this.  He denied any visual deficits, slurred speech, or other weakness 

in his extremities.  He also denies any history of previous strokes.  In the ER,

head CT scan was unremarkable but MRI of the brain revealed acute CVA.  Was 

evaluated by the ER doctor and patient declined TPA after receiving explanation 

of risks and benefits from the ER physician.  NIH score on presentation was 1.  

Currently being admitted for work-up of CVA.





Past Medical History


Cardiac Medical History: Reports: Hypertension - CONTROLLED/MEDICATED


   Denies: Coronary Artery Disease, Myocardial Infarction


Pulmonary Medical History: 


   Denies: Asthma, Bronchitis, Chronic Obstructive Pulmonary Disease (COPD), 

Pneumonia


Neurological Medical History: 


   Denies: Seizures


Malignancy Medical History: Reports: Skin Cancer - Squamous cell


GI Medical History: 


   Denies: Hepatitis, Hiatal Hernia


Musculoskeltal Medical History: 


   Denies: Arthritis


Hematology: 


   Denies: Anemia, Sickle Cell Disease





Past Surgical History


Past Surgical History: Reports: Other - Multiple squamous cell lesions removed. 

Bilateral cataract repair.


   Denies: Pacemaker





Social History


Lives with: Spouse/Significant other


Smoking Status: Never Smoker


Frequency of Alcohol Use: Social


Hx Recreational Drug Use: No





- Advance Directive


Resuscitation Status: Full Code





Family History


Family History: Hypertension


Parental Family History Reviewed: Yes


Children Family History Reviewed: NA


Sibling(s) Family History Reviewed.: Yes





Medication/Allergy


Home Medications: 








Diltiazem HCl [Cardizem Cd 240 mg Capsule.cr] 180 cap PO DAILY 02/23/15 


Lisinopril/Hydrochlorothiazide [Lisinopril-Hctz 10-12.5 mg Tab] 1 each PO DAILY 

07/29/20 








Allergies/Adverse Reactions: 


                                        





No Known Allergies Allergy (Verified 08/27/19 09:18)


   











Review of Systems


Constitutional: ABSENT: fever(s), headache(s)


Eyes: ABSENT: visual disturbances


Ears: ABSENT: hearing changes


Nose, Mouth, and Throat: ABSENT: headache(s)


Cardiovascular: ABSENT: palpitations


Respiratory: ABSENT: dyspnea


Gastrointestinal: ABSENT: abdominal pain, nausea, vomiting


Genitourinary: ABSENT: difficulty urinating, dysuria


Integumentary: ABSENT: diaphoresis


Neurological: PRESENT: lack of coordination.  ABSENT: abnormal gait, abnormal 

speech, confusion, dizziness, paresthesias, tingling


Endocrine: ABSENT: polyuria


Hematologic/Lymphatic: ABSENT: easy bleeding


Allergic/Immunologic: ABSENT: seasonal rhinorrhea





Physical Exam


Vital Signs: 


                                        











Temp Pulse Resp BP Pulse Ox


 


 98.1 F   55 L  22 H  135/62 H  98 


 


 07/29/20 08:26  07/29/20 09:17  07/29/20 15:31  07/29/20 15:31  07/29/20 15:31








                                 Intake & Output











 07/28/20 07/29/20 07/30/20





 06:59 06:59 06:59


 


Weight   81.647 kg











General appearance: PRESENT: no acute distress, cooperative


Eye exam: PRESENT: EOMI


Neck exam: ABSENT: JVD


Respiratory exam: PRESENT: clear to auscultation helio, unlabored.  ABSENT: 

tachypnea, wheezes


Cardiovascular exam: PRESENT: RRR, +S1, +S2.  ABSENT: tachycardia


GI/Abdominal exam: PRESENT: soft.  ABSENT: rebound, rigid, tenderness


Extremities exam: ABSENT: pedal edema


Neurological exam: PRESENT: alert, awake, oriented to person, oriented to place,

oriented to time, oriented to situation, ataxia - right hand only on finger to 

nose test, CN II-XII grossly intact.  ABSENT: motor sensory deficit, aphasic


Psychiatric exam: PRESENT: appropriate affect, normal mood.  ABSENT: agitated, 

anxious


Focused psych exam: ABSENT: pressured speech


Skin exam: ABSENT: jaundice





Results


Laboratory Results: 


                                        





                                 07/29/20 08:34 





                                 07/29/20 08:34 





                                        











  07/29/20 07/29/20 07/29/20





  08:34 08:34 10:44


 


WBC  4.5  


 


RBC  5.62 H  


 


Hgb  16.2  


 


Hct  48.2  


 


MCV  86  


 


MCH  28.8  


 


MCHC  33.6  


 


RDW  14.6 H  


 


Plt Count  95 L  


 


Seg Neutrophils %  59.1  


 


Sodium   137.1 


 


Potassium   4.2 


 


Chloride   105 


 


Carbon Dioxide   25 


 


Anion Gap   7 


 


BUN   25 H 


 


Creatinine   1.23 


 


Est GFR ( Amer)   > 60 


 


Glucose   112 H 


 


Calcium   9.8 


 


Total Bilirubin   1.0 


 


AST   22 


 


Alkaline Phosphatase   82 


 


Total Protein   7.2 


 


Albumin   4.3 


 


Urine Color    STRAW


 


Urine Appearance    SLIGHTLY-CLOUDY


 


Urine pH    6.0


 


Ur Specific Gravity    1.008


 


Urine Protein    NEGATIVE


 


Urine Glucose (UA)    NEGATIVE


 


Urine Ketones    NEGATIVE


 


Urine Blood    NEGATIVE


 


Urine Nitrite    NEGATIVE


 


Ur Leukocyte Esterase    NEGATIVE


 


Urine WBC (Auto)    1


 


Urine RBC (Auto)    1








                                        











  07/29/20 07/29/20





  08:34 08:34


 


Creatine Kinase  89 


 


Troponin I   < 0.012











Impressions: 


                                        





Head CT  07/29/20 08:26


IMPRESSION:  Subtle areas of hypoattenuation involving the left basal ganglia 

and insula suggestive of infarct.  Questionable dense left MCA sign.  Consider 

MRI for further characterization.


EVIDENCE OF ACUTE STROKE: YES.  LEFT MCA.


 








Chest X-Ray  07/29/20 08:27


IMPRESSION:  NO ACUTE RADIOGRAPHIC FINDING IN THE CHEST.


 








Head MRI  07/29/20 08:55


IMPRESSION:  Given faint correlative signal abnormality on FLAIR imaging, small 

foci of restricted diffusion seen within the subcortical white matter of the 

left parietooccipital parenchyma is consistent with acute to subacute ischemic 

injury in the left anterior cerebral artery territory.  Left precentral gyrus 

subcortical T2 prolongation is consistent with sequela of previous ischemic 

injury.  Otherwise normal appearance of the brain.


EVIDENCE OF ACUTE STROKE: YES.   LEFT KATHY


 














Assessment and Plan





- Diagnosis


(1) Acute CVA (cerebrovascular accident)


Is this a current diagnosis for this admission?: Yes   


Plan: 


MRI showing findings consistent with acute stroke involving the left parietal 

occipital region in the distribution of left KATHY


NIH score is pretty much 1.  Only noticeable deficit is right hand ataxia


Check carotid Dopplers and echocardiogram


Cardiac monitoring


Neurochecks


Permissive hypertension x24 hours


Bedrest x24 hours


PT/OT/ consulted.  Patient will benefit from continued occupational

therapy in the long run.


Aspirin Plavix atorvastatin


Check lipid panel hemoglobin A1c in a.m.








(2) Hypertension


Qualifiers: 


   Hypertension type: essential hypertension   Qualified Code(s): I10 - 

Essential (primary) hypertension   


Is this a current diagnosis for this admission?: Yes   


Plan: 


Permissive hypertension for now.  Will resume antihypertensives tomorrow








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


Is this a current diagnosis for this admission?: Yes   


Plan: 


EKG shows first-degree AV block which was also present in 2015.  However at this

time, he is mildly bradycardic in the low 50s.  I will keep his diltiazem on 

hold for now.  Monitor on telemetry.








(4) Irregular heart beat


Is this a current diagnosis for this admission?: Yes   


Plan: 


Patient states he has history of irregular heart beat.  He is however unable to 

tell me if he has a alejo history of atrial fibrillation.  He does follow with 

Dr. Estela Allen at [facility].  I will contact Dr. Allen to verify if patient 

truly does have history of A. fib as he does seem to be on diltiazem as this 

would mean that patient needs to be initiated on anticoagulation given his 

recent stroke.








- Time


Time Spent with patient: 35 or more minutes


Anticipated Discharge Disposition: Home, Self Care


Anticipated Discharge Timeframe: within 24 hours

## 2020-07-29 NOTE — EKG REPORT
SEVERITY:- ABNORMAL ECG -

SINUS RHYTHM

FIRST DEGREE AV BLOCK

:

Confirmed by: Sammi Jarvis MD 29-Jul-2020 08:39:49

## 2020-07-29 NOTE — RADIOLOGY REPORT (SQ)
EXAM DESCRIPTION:  MRI HEAD WITHOUT



IMAGES COMPLETED DATE/TIME:  7/29/2020 10:30 am



REASON FOR STUDY:  right arm clumbsy, CT suggests L MCA



COMPARISON:  Noncontrast head CT 7/29/2020



TECHNIQUE:  Multiplanar imaging includes non-contrasted T1, T2, FLAIR, and diffusion with ADC map seq
uences. Images stored on PACS.



LIMITATIONS:  None.



FINDINGS:  ANATOMY: No anomalies. Normal vascular flow voids. Pituitary fossa normal.

CSF SPACES: Normal in size and contour. No hemorrhage.

CEREBRUM: The sulci and gyri are normal in size and contour for age.  Subcortical T2 prolongation is 
seen within the left precentral gyrus, consistent with sequela of previous ischemic injury.  Few scat
tered foci of T2 prolongation seen within the white matter are nonspecific, but may represent sequela
 of microvascular ischemic injury.  Scattered foci of subcortical restricted diffusion with associate
d faint T2 prolongation on FLAIR imaging is seen of the left parietooccipital lobe in a left anterior
 cerebral artery distribution.  Incidental note is made of prominent per caliber been perivascular sp
aces within the basal ganglia.

POSTERIOR FOSSA: No signal alteration. No hemorrhage. No edema, masses or mass effect.  Internal zayda
tory canals, cerebello-pontine angles, mastoids normal.

DIFFUSION IMAGING: As above.

ORBITS: No masses. Globes normal.

PARANASAL  SINUSES: No fluid levels.  Mucosa normal.

OTHER: No other significant finding.



IMPRESSION:  Given faint correlative signal abnormality on FLAIR imaging, small foci of restricted di
ffusion seen within the subcortical white matter of the left parietooccipital parenchyma is consisten
t with acute to subacute ischemic injury in the left anterior cerebral artery territory.  Left precen
tral gyrus subcortical T2 prolongation is consistent with sequela of previous ischemic injury.  Other
wise normal appearance of the brain.

EVIDENCE OF ACUTE STROKE: YES.   LEFT KATHY



TECHNICAL DOCUMENTATION:  JOB ID:  [id number]

 2011 [employer]- All Rights Reserved



Reading location - IP/workstation name: [email or web address]

## 2020-07-29 NOTE — ER DOCUMENT REPORT
ED NIH Stroke Scale





- NIH Stroke Scale


*: 1. NIH scale should be completed with appropriate accompanying assessment 

tools.


*: 2. The NIH should reflect what the patient is capable of doing and should not

be coached by the clinician.


1a. Level of Consciousness: 0=Alert;keenly responsive


-: 1=Drowsy


-: 2=Obtunded


-: 3=Coma/unresponsive or reflex to noxious stimuli.


1a. Responses: 0


1b. Orientation Questions: a. What month is it?


-: b. How old are you?


-: 0=Answers both questions correctly.


-: 1=Answers one question correctly or patient is intubated or has orotracheal 

trauma.


-: 2=Answers neither question correctly.


1b. Responses: 0


1c. Response to commands: a. Open and close eyes?


-: b.  and release hand?


-: Credit is given despite weakness. Demonstration of task is permitted. 

Substitute command if hands cannot be used.


-: 0=Performs both tasks correctly


-: 1=Performs one task correctly


-: 2=Performs neither task correctly


1c. Responses: 0


2. Gaze: Establish eye contact and instruct patient to "Follow my finger"


-: 0=Normal


-: 1=Partial gaze palsy. Gaze is abnormal in one or both eyes, but where forced 

deviation or total gaze paresis is not present.


-: 2=Forced deviation or total gaze paresis.


2. Responses: 0


3. Visual Fields: Sees fingers in all four quadrants.


-: 0=No visual loss.


-: 1=Partial hemianopsia.


-: 2=Complete hemianopsia.


-: 3=Bilateral hemianopsia (including Cortical blindness)


3. Responses: 0


4. Facial Movement: Instruct patient to:


-: a. Show me your teeth


-: b. Raise your eyebrows


-: c. Close your eyes


-: d. Smile


-: 0=Normal symmetrical movement


-: 1=Minor paralysis (flattened nasolabial fold, asymmetry on smiling).


-: 2=Partial paralysis (total or near total paralysis of lower face).


-: 3=Complete paralysis of upper and lower face


4. Responses: 0


5. Motor functions (left arm): Alternate sides and extend each arm with palms 

down (90 degrees if sitting or 45 degrees for supine).


-: 0=No drift;limb holds for full 10 seconds.


-: 1=Drift; limb holds but drifts down before full 10 seconds, but does not hit 

bed.


-: 2=Some effort against gravity; limb cannot get to or maintain position.


-: 3=No effort against gravity; limb falls.


-: 4=No movement.


-: UN=Amputation, joint fusion, explain in comments.


5. Responses (left arm): 0


5. Motor Functions (right arm): Alternate sides and extend each arm with palms 

down (90 degrees if sitting or 45 degrees for supine).


-: 0=No drift;limb holds for full 10 seconds.


-: 1=Drift; limb holds but drifts down before full 10 seconds, but does not hit 

bed.


-: 2=Some effort against gravity; limb cannot get to or maintain position.


-: 3=No effort against gravity; limb falls.


-: 4=No movement.


-: UN=Amputation, joint fusion, explain in comments.


5. Responses (right arm): 0


6. Motor Functions (left leg): With patient lying supine, alternate sides and 

extend each leg (30 degrees always while supine).


-: 0=No drift, leg holds position for full 5 seconds


-: 1=Drift; leg falls before full 5 seconds but does not hit bed.


-: 2=Some effort against gravity, leg falls to bed but some effort against 

gravity.


-: 3=No effort against gravity, leg falls to bed immediately.


-: 4=No movement.


-: UN=Amputation, joint fusion; explain in comments.


6. Responses (left leg): 0


6. Motor Functions (right leg): With patient lying supine, alternate sides and 

extend each leg (30 degrees always while supine).


-: 0=No drift, leg holds position for full 5 seconds


-: 1=Drift; leg falls before full 5 seconds but does not hit bed.


-: 2=Some effort against gravity, leg falls to bed but some effort against 

gravity.


-: 3=No effort against gravity, leg falls to bed immediately.


-: 4=No movement.


-: UN=Amputation, joint fusion; explain in comments.


6. Responses (right leg): 0


7. Limb Ataxia: With eyes open instruct patient to:


-: a. "Touch your finger to your nose".


-: b. "Touch your heel to your shin"


-: 0=Absent


-: 1=Present in one limb.


-: 2=Present in two limbs.


-: UN=Amputation or joint fusion; explain in comments.


7. Responses: 1


7. If ataxia present choose as appropriate: Right arm


8. Sensory: Test sensation using pinprick or noxious stimuli.  Test as many body

parts as possible.


-: 0=Normal;no sensory loss


-: 1=Mile to moderate sensory loss (patient feels pin prick but is less sharp on

affected side).


-: 2=Severe or total sensory loss.


8. Responses: 0


9. Best Language: Instruct patient to:


-: a. "Describe what you see in this picture."


-: b. "Name the items in this picture."


-: c. "Read these sentences."


-: 0=No aphasia, normal


-: 1=Mild to moderate aphasia.


-: 2=Severe aphasia


-: 3=Mute, global aphasia, no usable speech or auditory comprehension.


9. Responses: 0


10. Articulation, Dysarthia: Instruct patient to:


-: "Read these words" or "Repeat these words"


-: 0=Normal


-: 1=Mild to moderate; patient may slur some words but can be understood without

difficulty.


-: 2=Severe; patients speech so slurred as to be unintelligible in the absence 

of dysphasia.


-: UN=Intubated or other physical barrier, explain in comments.


10. Responses: 0


11. Extinction or inattention: 0=No abnormality


-: 1= Visual, tactile, auditory, spatial, or personal inattention or extinction 

to bilateral simulation in one or the sensory modalities.


-: 2=Profound audra-inattention or audra-inattention to more than one modality; 

does not recognize own hand.


11. Responses: 0


Total Score: 1

## 2020-07-30 VITALS — SYSTOLIC BLOOD PRESSURE: 139 MMHG | DIASTOLIC BLOOD PRESSURE: 70 MMHG

## 2020-07-30 LAB
CHOLEST SERPL-MCNC: 159.33 MG/DL (ref 0–200)
LDLC SERPL DIRECT ASSAY-MCNC: 91 MG/DL (ref ?–100)
TRIGL SERPL-MCNC: 123 MG/DL (ref ?–150)
VLDLC SERPL CALC-MCNC: 25 MG/DL (ref 10–31)

## 2020-07-30 RX ADMIN — FAMOTIDINE SCH: 20 TABLET, FILM COATED ORAL at 09:55

## 2020-07-30 RX ADMIN — Medication SCH: at 13:59

## 2020-07-30 RX ADMIN — Medication SCH ML: at 06:29

## 2020-07-30 NOTE — PDOC DISCHARGE SUMMARY
Impression





- Admit/DC Date/PCP


Admission Date/Primary Care Provider: 


  07/29/20 11:44





  MICHELLE MARKHAM MD





Discharge Date: 07/30/20





- Discharge Diagnosis


(1) Acute CVA (cerebrovascular accident)


Is this a current diagnosis for this admission?: Yes   





(2) Hypertension


Is this a current diagnosis for this admission?: Yes   





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


Is this a current diagnosis for this admission?: Yes   





(4) Paroxysmal SVT (supraventricular tachycardia)


Is this a current diagnosis for this admission?: Yes   





- Additional Information


Resuscitation Status: Full Code


Discharge Diet: Cardiac


Discharge Activity: Activity As Tolerated


Referrals: 


MICHELLE MARKHAM MD [Primary Care Provider] - 08/06/20 2:00 pm


ESTELA GREEN MD [NO LOCAL MD] -  (The office will call the patient for follow-

up.)


Prescriptions: 


Aspirin [Ecotrin 81 mg EC Tablet] 81 mg PO DAILY #30 tabec


Atorvastatin Calcium [Lipitor 40 mg Tablet] 40 mg PO QHS #30 tablet


Clopidogrel Bisulfate [Plavix 75 mg Tablet] 75 mg PO DAILY 20 Days #20 tablet


Home Medications: 








Lisinopril/Hydrochlorothiazide [Lisinopril-Hctz 10-12.5 mg Tab] 1 each PO DAILY 

07/29/20 


Aspirin [Ecotrin 81 mg EC Tablet] 81 mg PO DAILY #30 tabec 07/30/20 


Atorvastatin Calcium [Lipitor 40 mg Tablet] 40 mg PO QHS #30 tablet 07/30/20 


Clopidogrel Bisulfate [Plavix 75 mg Tablet] 75 mg PO DAILY 20 Days #20 tablet 

07/30/20 


Diltiazem HCl [Diltiazem 24Hr ER] 180 mg PO DAILY 07/30/20 











History of Present Illiness


History of Present Illness: 


YOUNG ODONNELL is a 80 year old male with a history of hypertension and " 

irregular heart rhythm", who presents to the hospital with complaints of right-

hand ataxia which started this morning around 6 AM.  He had no other symptoms 

besides this.  He denied any visual deficits, slurred speech, or other weakness 

in his extremities.  He also denies any history of previous strokes.  In the ER,

head CT scan was unremarkable but MRI of the brain revealed acute CVA.  Was 

evaluated by the ER doctor and patient declined TPA after receiving explanation 

of risks and benefits from the ER physician.  NIH score on presentation was 1.  

Currently being admitted for work-up of CVA.





Hospital Course


Hospital Course: 


Patient presented with stroke.  He has been evaluated by ER physician and he had

declined TPA.  NIH score was pretty much just over 1 with his only deficit pain 

right hand ataxia.  MRI of the brain was done which confirmed stroke in his left

parieto-occipital region in the region of the left KATHY.  Stroke protocol was 

initiated.  Patient was placed on bedrest with flat in bed and permissive 

hypertension for 24 hours.  Bedside swallow screen was passed.  He was started 

on aspirin Plavix.  Lipid panel also showed LDL in the 90s.  He was started on 

atorvastatin 40 mg nightly.  Hemoglobin A1c was normal.  EKG on presentation 

showed first-degree AV block which was present before.  He was noted to be 

bradycardic on telemetry monitor in the 50s.  As a result of this his diltiazem 

was stopped.  I also discussed with his electrophysiologist Dr. Estela Green as 

patient told me he had history of irregular heart beat and Dr. Estela Green 

confirms to me that patient has paroxysmal SVT but no confirmation of A. fib.  

Patient had carotid ultrasound done which was unremarkable as well as a normal 

echocardiogram.  I am discharging patient on aspirin lifelong, Plavix for 20 

days and atorvastatin.  This morning, shortly before discharge, patient went 

into SVT again into the 140s after ambulating.  No irregularity to suggest A. 

fib noted on the telemetry monitor.  His SVT subsided spontaneously and back to 

sinus rhythm with first-degree AV block but now heart rate is sustaining in the 

70s.  As a result of this I have instructed patient to resume his diltiazem 

tablet but to watch his heart rate closely and we will get patient a follow-up 

appointment with Dr. Estela Green to review this issue.  As patient has no 

documented A. fib/a flutter, he will not be discharged on any anticoagulation 

and to follow-up with Estela Green to determine if this is needed.  He has been 

set up for outpatient occupational therapy.





Physical Exam


Vital Signs: 


                                        











Temp Pulse Resp BP Pulse Ox


 


 97.7 F   66   16   127/86 H  100 


 


 07/30/20 04:00  07/30/20 09:00  07/30/20 09:00  07/30/20 09:00  07/30/20 09:00








                                 Intake & Output











 07/29/20 07/30/20 07/31/20





 06:59 06:59 06:59


 


Weight  82.6 kg 











General appearance: PRESENT: no acute distress, cooperative


Neck exam: ABSENT: JVD


Respiratory exam: PRESENT: clear to auscultation helio, unlabored


Cardiovascular exam: PRESENT: RRR, +S1, +S2


Neurological exam: PRESENT: alert, awake, oriented to person, oriented to place,

oriented to time, oriented to situation, ataxia, CN II-XII grossly intact.  

ABSENT: motor sensory deficit





Results


Laboratory Results: 


                                        











WBC  4.5 10^3/uL (4.0-10.5)   07/29/20  08:34    


 


RBC  5.62 10^6/uL (4.35-5.55)  H  07/29/20  08:34    


 


Hgb  16.2 g/dL (13.5-17.0)   07/29/20  08:34    


 


Hct  48.2 % (37.9-51.0)   07/29/20  08:34    


 


MCV  86 fl (80-97)   07/29/20  08:34    


 


MCH  28.8 pg (27.0-33.4)   07/29/20  08:34    


 


MCHC  33.6 g/dL (32.0-36.0)   07/29/20  08:34    


 


RDW  14.6 % (11.5-14.0)  H  07/29/20  08:34    


 


Plt Count  95 10^3/uL (150-450)  L  07/29/20  08:34    


 


Lymph % (Auto)  25.2 % (13-45)   07/29/20  08:34    


 


Mono % (Auto)  12.2 % (3-13)   07/29/20  08:34    


 


Eos % (Auto)  2.8 % (0-6)   07/29/20  08:34    


 


Baso % (Auto)  0.7 % (0-2)   07/29/20  08:34    


 


Absolute Neuts (auto)  2.7 10^3/uL (1.7-8.2)   07/29/20  08:34    


 


Absolute Lymphs (auto)  1.1 10^3/uL (0.5-4.7)   07/29/20  08:34    


 


Absolute Monos (auto)  0.6 10^3/uL (0.1-1.4)   07/29/20  08:34    


 


Absolute Eos (auto)  0.1 10^3/uL (0.0-0.6)   07/29/20  08:34    


 


Absolute Basos (auto)  0.0 10^3/uL (0.0-0.2)   07/29/20  08:34    


 


Seg Neutrophils %  59.1 % (42-78)   07/29/20  08:34    


 


PT  13.7 SEC (11.4-15.4)   07/29/20  08:34    


 


INR  1.05   07/29/20  08:34    


 


APTT  28.2 SEC (23.5-35.8)   07/29/20  08:34    


 


Sodium  137.1 mmol/L (137-145)   07/29/20  08:34    


 


Potassium  4.2 mmol/L (3.6-5.0)   07/29/20  08:34    


 


Chloride  105 mmol/L ()   07/29/20  08:34    


 


Carbon Dioxide  25 mmol/L (22-30)   07/29/20  08:34    


 


Anion Gap  7  (5-19)   07/29/20  08:34    


 


BUN  25 mg/dL (7-20)  H  07/29/20  08:34    


 


Creatinine  1.23 mg/dL (0.52-1.25)   07/29/20  08:34    


 


Est GFR ( Amer)  > 60  (>60)   07/29/20  08:34    


 


Est GFR (MDRD) Non-Af  57  (>60)  L  07/29/20  08:34    


 


Glucose  112 mg/dL ()  H  07/29/20  08:34    


 


Hemoglobin A1c %  5.6 % (4.7-6.0)   07/30/20  05:23    


 


Calcium  9.8 mg/dL (8.4-10.2)   07/29/20  08:34    


 


Total Bilirubin  1.0 mg/dL (0.2-1.3)   07/29/20  08:34    


 


Direct Bilirubin  0.0 mg/dL (0.0-0.4)   07/29/20  08:34    


 


Neonat Total Bilirubin  Not Reportable   07/29/20  08:34    


 


Neonat Direct Bilirubin  Not Reportable   07/29/20  08:34    


 


Neonat Indirect Bili  Not Reportable   07/29/20  08:34    


 


AST  22 U/L (17-59)   07/29/20  08:34    


 


ALT  15 U/L (<50)   07/29/20  08:34    


 


Alkaline Phosphatase  82 U/L ()   07/29/20  08:34    


 


Creatine Kinase  89 U/L ()   07/29/20  08:34    


 


Troponin I  < 0.012 ng/mL  07/29/20  08:34    


 


Total Protein  7.2 g/dL (6.3-8.2)   07/29/20  08:34    


 


Albumin  4.3 g/dL (3.5-5.0)   07/29/20  08:34    


 


Triglycerides  123 mg/dL (<150)   07/30/20  05:23    


 


Cholesterol  159.33 mg/dL (0-200)   07/30/20  05:23    


 


LDL Cholesterol Direct  91 mg/dL (<100)   07/30/20  05:23    


 


VLDL Cholesterol  25.0 mg/dL (10-31)   07/30/20  05:23    


 


HDL Cholesterol  47 mg/dL (>40)   07/30/20  05:23    


 


TSH  1.46 uIU/mL (0.47-4.68)   07/30/20  05:23    


 


Urine Color  STRAW   07/29/20  10:44    


 


Urine Appearance  SLIGHTLY-CLOUDY   07/29/20  10:44    


 


Urine pH  6.0  (5.0-9.0)   07/29/20  10:44    


 


Ur Specific Gravity  1.008   07/29/20  10:44    


 


Urine Protein  NEGATIVE mg/dL (NEGATIVE)   07/29/20  10:44    


 


Urine Glucose (UA)  NEGATIVE mg/dL (NEGATIVE)   07/29/20  10:44    


 


Urine Ketones  NEGATIVE mg/dL (NEGATIVE)   07/29/20  10:44    


 


Urine Blood  NEGATIVE  (NEGATIVE)   07/29/20  10:44    


 


Urine Nitrite  NEGATIVE  (NEGATIVE)   07/29/20  10:44    


 


Urine Bilirubin  NEGATIVE  (NEGATIVE)   07/29/20  10:44    


 


Urine Urobilinogen  NEGATIVE mg/dL (<2.0)   07/29/20  10:44    


 


Ur Leukocyte Esterase  NEGATIVE  (NEGATIVE)   07/29/20  10:44    


 


Urine WBC (Auto)  1 /HPF  07/29/20  10:44    


 


Urine RBC (Auto)  1 /HPF  07/29/20  10:44    


 


Urine Mucus (Auto)  OCC /LPF  07/29/20  10:44    


 


Urine Ascorbic Acid  NEGATIVE  (NEGATIVE)   07/29/20  10:44    








                                        











  07/29/20





  08:34


 


Troponin I  < 0.012











Impressions: 


                                        





Carotid Doppler Study  07/29/20 00:00


IMPRESSION:  NO HEMODYNAMICALLY SIGNIFICANT STENOSIS.


 








Head CT  07/29/20 08:26


IMPRESSION:  Subtle areas of hypoattenuation involving the left basal ganglia 

and insula suggestive of infarct.  Questionable dense left MCA sign.  Consider 

MRI for further characterization.


EVIDENCE OF ACUTE STROKE: YES.  LEFT MCA.


 








Chest X-Ray  07/29/20 08:27


IMPRESSION:  NO ACUTE RADIOGRAPHIC FINDING IN THE CHEST.


 








Head MRI  07/29/20 08:55


IMPRESSION:  Given faint correlative signal abnormality on FLAIR imaging, small 

foci of restricted diffusion seen within the subcortical white matter of the 

left parietooccipital parenchyma is consistent with acute to subacute ischemic 

injury in the left anterior cerebral artery territory.  Left precentral gyrus 

subcortical T2 prolongation is consistent with sequela of previous ischemic 

injury.  Otherwise normal appearance of the brain.


EVIDENCE OF ACUTE STROKE: YES.   LEFT KATHY


 














Plan


Time Spent: Less than 30 Minutes





Stroke


Is this a Stroke Patient?: Yes


Stroke Pt being discharged on Anti-thrombolytic therapy?: Yes


Stroke Pt being discharged on Anti-coagulation therapy?: No


Reason(s) for not prescribing Anti-coagulation therapy:: Not indicated


Stroke Pt being discharged on Statins?: Yes





Acute Heart Failure





- **


Is this a Heart Failure Patient?: No

## 2020-07-30 NOTE — EKG REPORT
SEVERITY:- ABNORMAL ECG -

ABNRM R PROG, CONSIDER ASMI OR LEAD PLACEMENT

SINUS RHYTHM  FIRST DEGREE AV BLOCK

:

Confirmed by: Sammi Jarvis MD 30-Jul-2020 17:54:21

## 2020-07-30 NOTE — RADIOLOGY REPORT (SQ)
EXAM DESCRIPTION:  CAROTID DOPPLER



IMAGES COMPLETED DATE/TIME:  7/29/2020 8:55 pm



REASON FOR STUDY:  acute CVA



COMPARISON:  None.



TECHNIQUE:  Grayscale ultrasound, Doppler velocity and spectra, and color Doppler images acquired of 
the extra-cranial carotid and vertebral arteries. Images stored on PACS.



LIMITATIONS:  None.



FINDINGS:  RIGHT CAROTID

CCA Velocities: Within normal limits.

ICA Velocities

 Peak systolic 0.51 m/s.

 End diastolic 0.15 m/s.

Proximal ICA/CCA peak systolic ratio 1.4.

Spectra normal. No significant plaque.

LEFT CAROTID

CCA Velocities: Within normal limits.

ICA Velocities

 Peak systolic 0.50 m/s.

 End diastolic 0.18 m/s.

Proximal ICA/CCA peak systolic ratio 1.2.

Spectra normal. No significant plaque.

VERTEBRAL ARTERIES: Antegrade flow.  Normal waveforms.

SUBCLAVIAN ARTERIES: Not imaged.

OTHER: No other significant finding.



IMPRESSION:  NO HEMODYNAMICALLY SIGNIFICANT STENOSIS.



COMMENT:  Quality ID #[id number]:  Velocity criteria are extrapolated from the diameter data as defined by t
he Society of Radiologists in Ultrasound Consensus Conference. Radiology 2003: 229; 340-346.



TECHNICAL DOCUMENTATION:  JOB ID:  [id number]

 2011 [employer]- All Rights Reserved



Reading location - IP/workstation name: [email or web address]

## 2020-08-01 ENCOUNTER — HOSPITAL ENCOUNTER (EMERGENCY)
Dept: HOSPITAL 62 - ER | Age: 80
Discharge: ANOTHER HOSPITAL | End: 2020-08-01
Payer: MEDICARE

## 2020-08-01 VITALS — SYSTOLIC BLOOD PRESSURE: 137 MMHG | DIASTOLIC BLOOD PRESSURE: 78 MMHG

## 2020-08-01 DIAGNOSIS — R29.709: ICD-10-CM

## 2020-08-01 DIAGNOSIS — R47.01: ICD-10-CM

## 2020-08-01 DIAGNOSIS — I63.9: Primary | ICD-10-CM

## 2020-08-01 DIAGNOSIS — I10: ICD-10-CM

## 2020-08-01 DIAGNOSIS — Z87.891: ICD-10-CM

## 2020-08-01 LAB
ADD MANUAL DIFF: NO
ALBUMIN SERPL-MCNC: 4.5 G/DL (ref 3.5–5)
ALP SERPL-CCNC: 75 U/L (ref 38–126)
ANION GAP SERPL CALC-SCNC: 7 MMOL/L (ref 5–19)
APTT BLD: 32 SEC (ref 23.5–35.8)
AST SERPL-CCNC: 24 U/L (ref 17–59)
BASOPHILS # BLD AUTO: 0 10^3/UL (ref 0–0.2)
BASOPHILS NFR BLD AUTO: 0.8 % (ref 0–2)
BILIRUB DIRECT SERPL-MCNC: 0 MG/DL (ref 0–0.4)
BILIRUB SERPL-MCNC: 2.1 MG/DL (ref 0.2–1.3)
BUN SERPL-MCNC: 22 MG/DL (ref 7–20)
CALCIUM: 10 MG/DL (ref 8.4–10.2)
CHLORIDE SERPL-SCNC: 103 MMOL/L (ref 98–107)
CK MB SERPL-MCNC: 2.17 NG/ML (ref ?–4.55)
CK SERPL-CCNC: 78 U/L (ref 55–170)
CO2 SERPL-SCNC: 27 MMOL/L (ref 22–30)
EOSINOPHIL # BLD AUTO: 0.1 10^3/UL (ref 0–0.6)
EOSINOPHIL NFR BLD AUTO: 1.2 % (ref 0–6)
ERYTHROCYTE [DISTWIDTH] IN BLOOD BY AUTOMATED COUNT: 14.8 % (ref 11.5–14)
GLUCOSE SERPL-MCNC: 107 MG/DL (ref 75–110)
HCT VFR BLD CALC: 52.6 % (ref 37.9–51)
HGB BLD-MCNC: 17.9 G/DL (ref 13.5–17)
INR PPP: 1.12
LYMPHOCYTES # BLD AUTO: 1.1 10^3/UL (ref 0.5–4.7)
LYMPHOCYTES NFR BLD AUTO: 23 % (ref 13–45)
MCH RBC QN AUTO: 29 PG (ref 27–33.4)
MCHC RBC AUTO-ENTMCNC: 34.1 G/DL (ref 32–36)
MCV RBC AUTO: 85 FL (ref 80–97)
MONOCYTES # BLD AUTO: 0.6 10^3/UL (ref 0.1–1.4)
MONOCYTES NFR BLD AUTO: 11.5 % (ref 3–13)
NEUTROPHILS # BLD AUTO: 3.2 10^3/UL (ref 1.7–8.2)
NEUTS SEG NFR BLD AUTO: 63.5 % (ref 42–78)
PLATELET # BLD: 102 10^3/UL (ref 150–450)
POTASSIUM SERPL-SCNC: 4.3 MMOL/L (ref 3.6–5)
PROT SERPL-MCNC: 7.7 G/DL (ref 6.3–8.2)
PROTHROMBIN TIME: 14.6 SEC (ref 11.4–15.4)
RBC # BLD AUTO: 6.18 10^6/UL (ref 4.35–5.55)
TOTAL CELLS COUNTED % (AUTO): 100 %
TROPONIN I SERPL-MCNC: < 0.012 NG/ML
WBC # BLD AUTO: 5 10^3/UL (ref 4–10.5)

## 2020-08-01 PROCEDURE — 84484 ASSAY OF TROPONIN QUANT: CPT

## 2020-08-01 PROCEDURE — 82550 ASSAY OF CK (CPK): CPT

## 2020-08-01 PROCEDURE — 71045 X-RAY EXAM CHEST 1 VIEW: CPT

## 2020-08-01 PROCEDURE — 93005 ELECTROCARDIOGRAM TRACING: CPT

## 2020-08-01 PROCEDURE — 82553 CREATINE MB FRACTION: CPT

## 2020-08-01 PROCEDURE — 80053 COMPREHEN METABOLIC PANEL: CPT

## 2020-08-01 PROCEDURE — 36415 COLL VENOUS BLD VENIPUNCTURE: CPT

## 2020-08-01 PROCEDURE — 85025 COMPLETE CBC W/AUTO DIFF WBC: CPT

## 2020-08-01 PROCEDURE — 93010 ELECTROCARDIOGRAM REPORT: CPT

## 2020-08-01 PROCEDURE — 85730 THROMBOPLASTIN TIME PARTIAL: CPT

## 2020-08-01 PROCEDURE — 85610 PROTHROMBIN TIME: CPT

## 2020-08-01 PROCEDURE — 99291 CRITICAL CARE FIRST HOUR: CPT

## 2020-08-01 PROCEDURE — 70450 CT HEAD/BRAIN W/O DYE: CPT

## 2020-08-01 NOTE — EKG REPORT
SEVERITY:- ABNORMAL ECG -

SINUS RHYTHM

FIRST DEGREE AV BLOCK

:

Confirmed by: Sammi Jarvis MD 01-Aug-2020 21:05:36

## 2020-08-01 NOTE — RADIOLOGY REPORT (SQ)
EXAM DESCRIPTION:  CHEST SINGLE VIEW



IMAGES COMPLETED DATE/TIME:  8/1/2020 8:29 am



REASON FOR STUDY:  STROKE LIKE SYMPTOMS



COMPARISON:  7/29/2020.



EXAM PARAMETERS:  NUMBER OF VIEWS: One view.

TECHNIQUE: Single frontal radiographic view of the chest acquired.

RADIATION DOSE: NA

LIMITATIONS: None.



FINDINGS:  LUNGS AND PLEURA: No opacities, masses or pneumothorax. No pleural effusion.

MEDIASTINUM AND HILAR STRUCTURES: No masses.  Contour normal.

HEART AND VASCULAR STRUCTURES: Heart normal in size.  Normal vasculature.

BONES: No acute findings.

HARDWARE: None in the chest.

OTHER: No other significant finding.



IMPRESSION:  NO ACUTE RADIOGRAPHIC FINDING IN THE CHEST.



TECHNICAL DOCUMENTATION:  JOB ID:  [id number]

 2011 [employer]- All Rights Reserved



Reading location - IP/workstation name: [email or web address]

## 2020-08-01 NOTE — ER DOCUMENT REPORT
Entered by ROSITA WILLSON SCRIBE  08/01/20 0851 





Acting as scribe for:JESSICA HARMAN MD





ED Neuro Symptoms/Deficit





- General


Chief Complaint: Trouble Talking


Stated Complaint: DIFFICULTY BREATHING


Time Seen by Provider: 08/01/20 08:44


Primary Care Provider: 


MICHELLE MARKHAM MD [Primary Care Provider] - Follow up as needed


Mode of Arrival: Ambulatory


Information source: Patient, Relative, [facility] Records


Notes: 





This 80 year old male patient presents to the emergency department today with 

complaints of aphasia. EMS reports that the patient's son told them that he was 

having difficulty speaking last night but the wife reports that it started just 

prior to arrival. After further questioning it appears that last night he had 

some difficulty saying what he was thinking(such as "describing the curtain as 

being gray") but this morning about 7:25 AM he became acutely dysarthric and 

nearly aphasic. 





The patient was seen here on 7/29/2020 with acute CVA involving the distribution

of the left KATHY.  He had minimal findings at that time which were only ataxia of

the right hand.  The patient nods in agreement and the spouse says that his symp

toms had completely resolved prior to discharge on 7/30/2020.


TRAVEL OUTSIDE OF THE [place] IN LAST 30 DAYS: No





- Related Data


Allergies/Adverse Reactions: 


                                        





No Known Allergies Allergy (Verified 08/27/19 09:18)


   











Past Medical History





- General


Information source: Patient, Relative, Emergency Med Personnel, [facility] Records





- Social History


Smoking Status: Former Smoker


Cigarette use (# per day): No


Chew tobacco use (# tins/day): No


Smoking Education Provided: No


Lives with: Spouse/Significant other


Family History: Reviewed & Not Pertinent, Hypertension





- Past Medical History


Cardiac Medical History: Reports: Hx Hypertension - CONTROLLED/MEDICATED


Neurological Medical History: Reports: Hx Cerebrovascular Accident


Malignancy Medical History: Reports Hx Skin Cancer - Squamous cell


Past Surgical History: Reports: Other - Multiple squamous cell lesions removed. 

Bilateral cataract repair.





- Immunizations


Hx Diphtheria, Pertussis, Tetanus Vaccination: No - UNK


Hx Pneumococcal Vaccination: 01/01/11





Review of Systems





- Review of Systems


-: Yes ROS unobtainable due to patient's medical condition - aphasia


Neurological/Psychological: See HPI, Speech impairment





Physical Exam





- Vital signs


Vitals: 


                                        











Temp Pulse Resp BP Pulse Ox


 


 98.5 F   63   17   122/72   96 


 


 08/01/20 08:35  08/01/20 08:35  08/01/20 08:35  08/01/20 08:35  08/01/20 08:35














- General


General appearance: Alert, Anxious


In distress: None





- HEENT


Head: Normocephalic, Atraumatic


Eyes: Normal


Pupils: PERRL


Neck: Normal.  No: Carotid bruit





- Respiratory


Respiratory status: No respiratory distress


Breath sounds: Normal





- Cardiovascular


Rhythm: Regular


Heart sounds: Normal auscultation


Murmur: No





- Abdominal


Inspection: Normal


Bowel sounds: Normal


Tenderness: Nontender





- Back


Back: Normal





- Extremities


General upper extremity: Other - Right fingers are held in flexion


General lower extremity: Normal inspection





- Neurological


Neuro grossly intact: No


Notes: 





There is a very slight right facial weakness noticed when the patient smiles.  

The patient has severe dysarthria and aphasia.


When asked a question he looks like he wants to answer and starts crying when he

is unable to either find or form the words.


He does follow simple commands, he does recognize his spouse.


He has vision in both eyes and extraocular muscles are intact.


The right upper extremity shows some ataxia to the right hand similar to what 

was seen when he presented with acute CVA 3 days ago.


The other 3 extremities are unremarkable on exam.





- Psychological


Associated symptoms: Normal affect, Normal mood





- Skin


Skin Temperature: Warm


Skin Moisture: Dry


Skin Color: Normal





Course





- Re-evaluation


Re-evalutation: 





08/01/20 15:48


I called MRI as soon as the patient arrived, they were just starting to get 

their magnet up and going.  Sometime later when I tried to order the MRIs, I 

will eventually got a hold of the tech who stated that the MRI was not going to 

work and it would be out of service all weekend.  At that point I called [facility]


Stroke team and made arrangements to transfer the patient.





- Vital Signs


Vital signs: 


                                        











Temp Pulse Resp BP Pulse Ox


 


 98.5 F   63   17   122/72   96 


 


 08/01/20 08:35  08/01/20 08:35  08/01/20 08:35  08/01/20 08:35  08/01/20 08:35














- Laboratory


Result Diagrams: 


                                 08/01/20 07:55





                                 08/01/20 07:55





- Diagnostic Test


Radiology reviewed: Image reviewed, Reports reviewed - CT scan does not show 

acute abnormalities.





- EKG Interpretation by Me


EKG shows normal: Sinus rhythm, Axis, Intervals, QRS Complexes, ST-T Waves


Rate: Normal - 61


Heart block present: 1st Degree


When compared to previous EKG there are: No significant change





Critical Care Note





- Critical Care Note


Total time excluding time spent on procedures (mins): 35


Comments: 





At least 35 minutes spent evaluating the patient, reviewing the history from his

stroke presentation 4 days ago, phone calls trying to arrange MRI locally, then 

phone calls to the stroke center at [facility], and later discussion with the 

neurologist called back.





ED Alteplase Inc/Exc Criteria





- Inclusion Criteria:


1: Patient presented to ED within 3 hours of acute ischemic stroke symptom 

onset?


-: No - Symptoms started several hours ago got acutely worse 45 minutes PTA


2: Did baseline CT exclude intracranial hemorrhage and/or other risk factors?


-: Yes


3: Is the age of the patient 18 years of age or greater?


-: Yes


*****: If any of the above questions are answered "NO" then stop, patient is not

a candidate for Alteplase,


*****: If all of the above questions are answered "YES" then continue with 

Exclusion Criteria.





- Exclusion Criteria:


1: Is there evidence of intracranial hemorrhage on baseline CT?


-: No


2: Is there suspicion of subarachnoid hemorrhage (even if CT negative)?


-: No


3: Is there a history of serious head trauma, recent previous stroke or MI 

within 3 months?


-: No


4: Does the patient have a clinical presentation consistent with MI or post-MI 

pericarditis?


-: No


5: Is there history of intracranial hemorrhage?


-: No


6: On repeated measurement is Systolic BP greater than 185mmHg or Diastolic BP 

greater that 110 mmHg and is aggressive treatment needed to reduce blood 

pressure to these limits (e.g. constant infusion of an anti-hypertensive)?


-: No


7: Did the patient awake with stroke symptoms?


-: No


8: Has the patient had a lumbar puncture or an arterial puncture at a non-

compressile site within 7 days?


-: No


9: With in the last 14 days did the patient have surgery or major trauma?


-: No


10: Is the patient pregnant or postpartum less than 2 weeks?


-: No


11: Was there any active bleeding or acute trauma?


-: No


12: Does the patient have intracranial neoplasm, arteriovenous malformation or 

aneurysm?


13: Does the patient have abnormal glucose (less than 50 or greater than 

400mg/dl)?  Record glucose in Comment.


-: No


14: Patient has rapidly improving symptoms at the time Alteplase is to be 

Administered.


-: No


15: Does the patient have any risks for bleeding, including but not limited to:


a.: Current use of Coumadin with PT greater than 15 seconds or INR greater than 

1.7.


b.: Current use of Pradaxa (Dabigatran).


c.: Heparin administereed within the past 48 hours and PTT elevated.


d.: Platelet count less than 100,000/mm.


e.: Major surgery or serious trauma within 14 days.


f.: Gastrointestinal or gynecological urinary bleeding within 14 days.


g.: Myocardial Infarction (MI) within 3 months.


-: No


*****: If the answer to any of the above questions is "YES" then stop, the 

patient is not a candidate for Alteplase.


*****: If the answer to all of the above questions is "NO" then the patient may 

be eligible for the Administration of Alteplase.


*****: If the patient is noted to have seizure activity at onset of Stroke 

symptoms; Consult Neurologist for further evaluation.





- The patient is:


-: Included and is eligible to receive Alteplase.  *Initiate bed placement at 

higher level of care*


--: No


Reviewed risks & benefits of thrombolytic therapy: I have reviewed the risks and

benefits of thrombolytic therapy with the patient and/or his/her family.


No


-: Excluded and not eligible to receive Alteplase for the above exclusions.


--: No


-: Excluded and not eligible to receive Alteplase for other reasons (specify in 

comments):


--: Yes - Acute CVA 3 days ago





- Diagnosis of TIA:


-: Patient presented with transient symptoms that are now resolved and no other 

neurologic findings are currently present. List symptoms in comments.


-: Patient is NOT a candidate for tPA.


-:  ____(put name in comment)______ has been consulted for admission and 

continued evaluation of risk factor assessment.





ED NIH Stroke Scale





- NIH Stroke Scale


*: 1. NIH scale should be completed with appropriate accompanying assessment 

tools.


*: 2. The NIH should reflect what the patient is capable of doing and should not

be coached by the clinician.


1a. Level of Consciousness: 0=Alert;keenly responsive


-: 1=Drowsy


-: 2=Obtunded


-: 3=Coma/unresponsive or reflex to noxious stimuli.


1a. Responses: 0


1b. Orientation Questions: a. What month is it?


-: b. How old are you?


-: 0=Answers both questions correctly.


-: 1=Answers one question correctly or patient is intubated or has orotracheal 

trauma.


-: 2=Answers neither question correctly.


1b. Responses: 2


1c. Response to commands: a. Open and close eyes?


-: b.  and release hand?


-: Credit is given despite weakness. Demonstration of task is permitted. 

Substitute command if hands cannot be used.


-: 0=Performs both tasks correctly


-: 1=Performs one task correctly


-: 2=Performs neither task correctly


1c. Responses: 0


2. Gaze: Establish eye contact and instruct patient to "Follow my finger"


-: 0=Normal


-: 1=Partial gaze palsy. Gaze is abnormal in one or both eyes, but where forced 

deviation or total gaze paresis is not present.


-: 2=Forced deviation or total gaze paresis.


2. Responses: 0


3. Visual Fields: Sees fingers in all four quadrants.


-: 0=No visual loss.


-: 1=Partial hemianopsia.


-: 2=Complete hemianopsia.


-: 3=Bilateral hemianopsia (including Cortical blindness)


3. Responses: 0


4. Facial Movement: Instruct patient to:


-: a. Show me your teeth


-: b. Raise your eyebrows


-: c. Close your eyes


-: d. Smile


-: 0=Normal symmetrical movement


-: 1=Minor paralysis (flattened nasolabial fold, asymmetry on smiling).


-: 2=Partial paralysis (total or near total paralysis of lower face).


-: 3=Complete paralysis of upper and lower face


4. Responses: 1


5. Motor functions (left arm): Alternate sides and extend each arm with palms 

down (90 degrees if sitting or 45 degrees for supine).


-: 0=No drift;limb holds for full 10 seconds.


-: 1=Drift; limb holds but drifts down before full 10 seconds, but does not hit 

bed.


-: 2=Some effort against gravity; limb cannot get to or maintain position.


-: 3=No effort against gravity; limb falls.


-: 4=No movement.


-: UN=Amputation, joint fusion, explain in comments.


5. Responses (left arm): 0


5. Motor Functions (right arm): Alternate sides and extend each arm with palms 

down (90 degrees if sitting or 45 degrees for supine).


-: 0=No drift;limb holds for full 10 seconds.


-: 1=Drift; limb holds but drifts down before full 10 seconds, but does not hit 

bed.


-: 2=Some effort against gravity; limb cannot get to or maintain position.


-: 3=No effort against gravity; limb falls.


-: 4=No movement.


-: UN=Amputation, joint fusion, explain in comments.


5. Responses (right arm): 0


6. Motor Functions (left leg): With patient lying supine, alternate sides and 

extend each leg (30 degrees always while supine).


-: 0=No drift, leg holds position for full 5 seconds


-: 1=Drift; leg falls before full 5 seconds but does not hit bed.


-: 2=Some effort against gravity, leg falls to bed but some effort against 

gravity.


-: 3=No effort against gravity, leg falls to bed immediately.


-: 4=No movement.


-: UN=Amputation, joint fusion; explain in comments.


6. Responses (left leg): 0


6. Motor Functions (right leg): With patient lying supine, alternate sides and 

extend each leg (30 degrees always while supine).


-: 0=No drift, leg holds position for full 5 seconds


-: 1=Drift; leg falls before full 5 seconds but does not hit bed.


-: 2=Some effort against gravity, leg falls to bed but some effort against 

gravity.


-: 3=No effort against gravity, leg falls to bed immediately.


-: 4=No movement.


-: UN=Amputation, joint fusion; explain in comments.


6. Responses (right leg): 0


7. Limb Ataxia: With eyes open instruct patient to:


-: a. "Touch your finger to your nose".


-: b. "Touch your heel to your shin"


-: 0=Absent


-: 1=Present in one limb.


-: 2=Present in two limbs.


-: UN=Amputation or joint fusion; explain in comments.


7. Responses: 1


7. If ataxia present choose as appropriate: Right arm


8. Sensory: Test sensation using pinprick or noxious stimuli.  Test as many body

parts as possible.


-: 0=Normal;no sensory loss


-: 1=Mile to moderate sensory loss (patient feels pin prick but is less sharp on

affected side).


-: 2=Severe or total sensory loss.


8. Responses: 0


9. Best Language: Instruct patient to:


-: a. "Describe what you see in this picture."


-: b. "Name the items in this picture."


-: c. "Read these sentences."


-: 0=No aphasia, normal


-: 1=Mild to moderate aphasia.


-: 2=Severe aphasia


-: 3=Mute, global aphasia, no usable speech or auditory comprehension.


9. Responses: 2


10. Articulation, Dysarthia: Instruct patient to:


-: "Read these words" or "Repeat these words"


-: 0=Normal


-: 1=Mild to moderate; patient may slur some words but can be understood without

difficulty.


-: 2=Severe; patients speech so slurred as to be unintelligible in the absence 

of dysphasia.


-: UN=Intubated or other physical barrier, explain in comments.


10. Responses: 2


11. Extinction or inattention: 0=No abnormality


-: 1= Visual, tactile, auditory, spatial, or personal inattention or extinction 

to bilateral simulation in one or the sensory modalities.


-: 2=Profound audra-inattention or audra-inattention to more than one modality; 

does not recognize own hand.


11. Responses: 0


Total Score: 8





Discharge





- Discharge


Clinical Impression: 


 Acute CVA (cerebrovascular accident)





Condition: Stable


Disposition: [facility]


Referrals: 


MICHELLE MARKHAM MD [Primary Care Provider] - Follow up as needed





I personally performed the services described in the documentation, reviewed and

edited the documentation which was dictated to the scribe in my presence, and it

accurately records my words and actions.

## 2020-08-01 NOTE — RADIOLOGY REPORT (SQ)
EXAM DESCRIPTION:  CT HEAD WITHOUT



IMAGES COMPLETED DATE/TIME:  8/1/2020 8:31 am



REASON FOR STUDY:  STROKE LIKE SYMPTOMS



COMPARISON:  7/29/2020.



TECHNIQUE:  Axial images acquired through the brain without intravenous contrast.  Images reviewed wi
th bone, brain and subdural windows.  Additional sagittal and coronal reconstructions were generated.
 Images stored on PACS.

All CT scanners at this facility use dose modulation, iterative reconstruction, and/or weight based d
osing when appropriate to reduce radiation dose to as low as reasonably achievable (ALARA).

[facility]: Dose Right  [facility]: CareDose    [facility]: Dose Right    [facility]: Teradose 4D    [facility]: Smart 4Tech



RADIATION DOSE:  CT Rad equipment meets quality standard of care and radiation dose reduction techniq
ues were employed. CTDIvol: 53.2 mGy. DLP: 1070 mGy-cm. mGy.



LIMITATIONS:  None.



FINDINGS:  VENTRICLES: Prominent.

CEREBRUM: No masses.  No hemorrhage.  No midline shift.  Areas of low density in the white matter mos
t likely due to chronic micro-vascular ischemic change.  No evidence for acute infarction.

CEREBELLUM: No masses.  No hemorrhage.  No alteration of density.  No evidence for acute infarction.

EXTRAAXIAL SPACES: Mild age-related involutional change.  No fluid collections.  No masses.

ORBITS AND GLOBE: No intra- or extraconal masses.  Normal contour of globe without masses.

CALVARIUM: No fracture.

PARANASAL SINUSES: No fluid or mucosal thickening.

SOFT TISSUES: No mass or hematoma.

OTHER: No other significant finding.



IMPRESSION:  MILD CHRONIC CHANGES OF ATROPHY AND MICROVASCULAR ISCHEMIA.  NO ACUTE PROCESS.

EVIDENCE OF ACUTE STROKE: NO.



TECHNICAL DOCUMENTATION:  JOB ID:  [id number]

Quality ID # 436: Final reports with documentation of one or more dose reduction techniques (e.g., Au
tomated exposure control, adjustment of the mA and/or kV according to patient size, use of iterative 
reconstruction technique)

 2011 [employer]- All Rights Reserved



Reading location - IP/workstation name: [email or web address]

## 2020-08-30 ENCOUNTER — HOSPITAL ENCOUNTER (EMERGENCY)
Dept: HOSPITAL 62 - ER | Age: 80
Discharge: ANOTHER HOSPITAL | End: 2020-08-30
Payer: MEDICARE

## 2020-08-30 VITALS — DIASTOLIC BLOOD PRESSURE: 79 MMHG | SYSTOLIC BLOOD PRESSURE: 147 MMHG

## 2020-08-30 DIAGNOSIS — R29.810: ICD-10-CM

## 2020-08-30 DIAGNOSIS — R47.81: ICD-10-CM

## 2020-08-30 DIAGNOSIS — I63.9: Primary | ICD-10-CM

## 2020-08-30 DIAGNOSIS — R53.1: ICD-10-CM

## 2020-08-30 DIAGNOSIS — I10: ICD-10-CM

## 2020-08-30 DIAGNOSIS — I48.91: ICD-10-CM

## 2020-08-30 DIAGNOSIS — E78.00: ICD-10-CM

## 2020-08-30 LAB
ADD MANUAL DIFF: NO
ALBUMIN SERPL-MCNC: 4.2 G/DL (ref 3.5–5)
ALP SERPL-CCNC: 63 U/L (ref 38–126)
ANION GAP SERPL CALC-SCNC: 8 MMOL/L (ref 5–19)
APTT BLD: 31.8 SEC (ref 23.5–35.8)
AST SERPL-CCNC: 30 U/L (ref 17–59)
BASOPHILS # BLD AUTO: 0 10^3/UL (ref 0–0.2)
BASOPHILS NFR BLD AUTO: 0.4 % (ref 0–2)
BILIRUB DIRECT SERPL-MCNC: 0.3 MG/DL (ref 0–0.4)
BILIRUB SERPL-MCNC: 1.9 MG/DL (ref 0.2–1.3)
BUN SERPL-MCNC: 18 MG/DL (ref 7–20)
CALCIUM: 9.6 MG/DL (ref 8.4–10.2)
CHLORIDE SERPL-SCNC: 99 MMOL/L (ref 98–107)
CK MB SERPL-MCNC: 1.51 NG/ML (ref ?–4.55)
CK SERPL-CCNC: 56 U/L (ref 55–170)
CO2 SERPL-SCNC: 25 MMOL/L (ref 22–30)
EOSINOPHIL # BLD AUTO: 0 10^3/UL (ref 0–0.6)
EOSINOPHIL NFR BLD AUTO: 0.9 % (ref 0–6)
ERYTHROCYTE [DISTWIDTH] IN BLOOD BY AUTOMATED COUNT: 14.8 % (ref 11.5–14)
GLUCOSE SERPL-MCNC: 113 MG/DL (ref 75–110)
HCT VFR BLD CALC: 50.1 % (ref 37.9–51)
HGB BLD-MCNC: 17.2 G/DL (ref 13.5–17)
INR PPP: 1.05
LYMPHOCYTES # BLD AUTO: 0.9 10^3/UL (ref 0.5–4.7)
LYMPHOCYTES NFR BLD AUTO: 18.4 % (ref 13–45)
MCH RBC QN AUTO: 29.2 PG (ref 27–33.4)
MCHC RBC AUTO-ENTMCNC: 34.3 G/DL (ref 32–36)
MCV RBC AUTO: 85 FL (ref 80–97)
MONOCYTES # BLD AUTO: 0.5 10^3/UL (ref 0.1–1.4)
MONOCYTES NFR BLD AUTO: 10.7 % (ref 3–13)
NEUTROPHILS # BLD AUTO: 3.5 10^3/UL (ref 1.7–8.2)
NEUTS SEG NFR BLD AUTO: 69.6 % (ref 42–78)
PLATELET # BLD: 101 10^3/UL (ref 150–450)
POTASSIUM SERPL-SCNC: 4.5 MMOL/L (ref 3.6–5)
PROT SERPL-MCNC: 7 G/DL (ref 6.3–8.2)
PROTHROMBIN TIME: 14 SEC (ref 11.4–15.4)
RBC # BLD AUTO: 5.9 10^6/UL (ref 4.35–5.55)
TOTAL CELLS COUNTED % (AUTO): 100 %
TROPONIN I SERPL-MCNC: < 0.012 NG/ML
WBC # BLD AUTO: 5 10^3/UL (ref 4–10.5)

## 2020-08-30 PROCEDURE — 70498 CT ANGIOGRAPHY NECK: CPT

## 2020-08-30 PROCEDURE — 93010 ELECTROCARDIOGRAM REPORT: CPT

## 2020-08-30 PROCEDURE — 70450 CT HEAD/BRAIN W/O DYE: CPT

## 2020-08-30 PROCEDURE — 84484 ASSAY OF TROPONIN QUANT: CPT

## 2020-08-30 PROCEDURE — 82550 ASSAY OF CK (CPK): CPT

## 2020-08-30 PROCEDURE — 93005 ELECTROCARDIOGRAM TRACING: CPT

## 2020-08-30 PROCEDURE — 82553 CREATINE MB FRACTION: CPT

## 2020-08-30 PROCEDURE — 99285 EMERGENCY DEPT VISIT HI MDM: CPT

## 2020-08-30 PROCEDURE — 85730 THROMBOPLASTIN TIME PARTIAL: CPT

## 2020-08-30 PROCEDURE — 36415 COLL VENOUS BLD VENIPUNCTURE: CPT

## 2020-08-30 PROCEDURE — 70551 MRI BRAIN STEM W/O DYE: CPT

## 2020-08-30 PROCEDURE — 71045 X-RAY EXAM CHEST 1 VIEW: CPT

## 2020-08-30 PROCEDURE — 85025 COMPLETE CBC W/AUTO DIFF WBC: CPT

## 2020-08-30 PROCEDURE — 70496 CT ANGIOGRAPHY HEAD: CPT

## 2020-08-30 PROCEDURE — 85610 PROTHROMBIN TIME: CPT

## 2020-08-30 PROCEDURE — 80053 COMPREHEN METABOLIC PANEL: CPT

## 2020-08-30 NOTE — RADIOLOGY REPORT (SQ)
EXAM DESCRIPTION:  MRI HEAD WITHOUT



IMAGES COMPLETED DATE/TIME:  8/30/2020 9:37 am



REASON FOR STUDY:  right sided weakness; stroke alert



COMPARISON:  MRI head 7/29/2020



TECHNIQUE:  Multiplanar imaging includes non-contrasted T1, T2, FLAIR, and Diffusion with ADC map seq
uences. Images stored on PACS.



LIMITATIONS:  None.



FINDINGS:  ANATOMY: No anomalies. Normal vascular flow voids. Pituitary fossa normal.

CSF SPACES: Normal in size and contour. No hemorrhage.

CEREBRUM: Moderate scattered high-signal intensity lesions throughout the white matter on FLAIR imagi
ng with distribution suggesting chronic micro-vascular ischemic change.  Sulci and gyri normal in siz
e and contour.  No evidence of hemorrhage, mass or extraaxial fluid collection.

POSTERIOR FOSSA: No signal alteration. No hemorrhage. No edema, masses or mass effect.  Internal zayda
tory canals, cerebello-pontine angles, mastoids normal.

DIFFUSION: There is increased area of restricted diffusion in the left anterior cerebral artery esteban
tory, precentral gyrus.  There also new foci of true restricted diffusion predominantly in the left p
osterior cerebral artery territory along the occipital horn of the left lateral ventricle.

ORBITS: No masses.  Globes normal.

PARANASAL SINUSES: No fluid levels.  Mucosa normal.

OTHER: No other significant finding.



IMPRESSION:  Interval development of additional restricted diffusion in the left precentral gyrus and
 along the occipital horn of the left lateral ventricle, consistent with acute ischemic injury in the
 left KATHY and left PCA territories.

EVIDENCE OF ACUTE STROKE: YES.  LEFT KATHY AND PCA.



COMMENT:  Findings were discussed with Dr. Kingston in the emergency room via phone at 1021 hours 8/30/2
020.



TECHNICAL DOCUMENTATION:  JOB ID:  [id number]

 2011 [employer]- All Rights Reserved



Reading location - IP/workstation name: [email or web address]

## 2020-08-30 NOTE — RADIOLOGY REPORT (SQ)
EXAM DESCRIPTION:  CTA HEAD; CTA NECK



IMAGES COMPLETED DATE/TIME:  8/30/2020 10:13 am; 8/30/2020 10:14 am



REASON FOR STUDY:  stroke symptoms



COMPARISON:  None.



TECHNIQUE:  Post IV contrast scanning, thin section axial imaging through the head and neck to evalua
te the arterial structures.  Source and MIP images are saved and reviewed on PACS.

Advanced 3D imaging as volume-rendering, MIPs, SSD performed? yes

All CT scanners at this facility use dose modulation, iterative reconstruction, and/or weight based d
osing when appropriate to reduce radiation dose to as low as reasonably achievable (ALARA).

[facility]: Dose Right  [facility]: CareDose    [facility]: Dose Right    [facility]: Teradose 4D    [facility]: Smart Technologies



RENAL FUNCTION:  BUN 18; creatinine 1.02



RADIATION DOSE:  CT Rad equipment meets quality standard of care and radiation dose reduction techniq
ues were employed. CTDIvol: 12.9 - 28.3 mGy. DLP: 529 mGy-cm.



LIMITATIONS:  None.



FINDINGS:  [place] OF LY: The anterior cerebral arteries are patent.  There is a normal anterior c
ommunicating artery.  The right A1 segment is atretic.  There is normal opacification of the right mi
ddle cerebral artery including the M1, M2, and M3 divisions.  The left A1 segment is normal.  The lef
t middle cerebral artery M1 segment and M2 segment are normally opacified.  The left M3 segment is no
t well opacified.  There is a fetal origin of the right posterior cerebral artery.  The left posterio
r cerebral artery originates from the basilar and is normal in appearance.  No left posterior communi
cating artery is seen.

POSTERIOR CIRCULATION: The distal vertebral arteries are patent as is the basilar artery. No aneurysm
.

BRAIN: No gross enhancing lesions as visualized.  The superior cerebral hemispheres are not included 
in the field of view.

BONES: Intact as visualized.

SINUSES: No fluid or mucosal thickening.

OTHER: No other significant finding.

AORTIC ARCH: Normal three-vessel origin.  Bilateral subclavian arteries are patent.  No  dissection.

RIGHT CAROTIDS: Patent common, internal and external carotid arteries without suggestion of significa
nt stenosis.  There is moderate atherosclerotic plaque at the carotid bulb.  No dissection.

RIGHT VERTEBRAL: Patent.  No dissection.

LEFT CAROTIDS: Patent common, internal and external carotid arteries without suggestion of significan
t stenosis.  There is moderate atherosclerotic plaque at the carotid bulb.  No dissection.

LEFT VERTEBRAL: Patent.  No dissection.

OTHER: 3-D  reconstructions confirm findings.



IMPRESSION:  Poor opacification of the M3 segment of the left middle cerebral artery.  This may be ch
ronic, but could relate to some watershed territory insufficiency.  Other normal variance in the intr
acranial vasculature are described above.  No dissection or aneurysm.

No significant stenosis, dissection, or aneurysm of the extracranial carotid or vertebral arteries.



COMMENT:  Findings were discussed with Dr. Kingston in the emergency department via phone at 1040 hours 
8/30/2020.



TECHNICAL DOCUMENTATION:  JOB ID:  [id number]

Quality ID # 436: Final reports with documentation of one or more dose reduction techniques (e.g., Au
tomated exposure control, adjustment of the mA and/or kV according to patient size, use of iterative 
reconstruction technique)

 2011 [employer]- All Rights Reserved



Reading location - IP/workstation name: [email or web address]

## 2020-08-30 NOTE — ER DOCUMENT REPORT
Doctor's Note


Notes: 





08/30/20 10:20


I received a call from radiology.  MRI appears to have ischemia in the left 

posterior area.  Will update accepting team.

## 2020-08-30 NOTE — RADIOLOGY REPORT (SQ)
EXAM DESCRIPTION:  CT HEAD WITHOUT



IMAGES COMPLETED DATE/TIME:  8/30/2020 8:34 am



REASON FOR STUDY:  Right sided weakness; stroke alert



COMPARISON:  None.



TECHNIQUE:  Axial images acquired through the brain without intravenous contrast.  Images reviewed wi
th bone, brain and subdural windows.  Additional sagittal and coronal reconstructions were generated.
 Images stored on PACS.

All CT scanners at this facility use dose modulation, iterative reconstruction, and/or weight based d
osing when appropriate to reduce radiation dose to as low as reasonably achievable (ALARA).

[facility]: Dose Right  [facility]: CareDose    [facility]: Dose Right    [facility]: Teradose 4D    [facility]: Smart Gojimo



RADIATION DOSE:  CT Rad equipment meets quality standard of care and radiation dose reduction techniq
ues were employed. CTDIvol: 53.2 mGy. DLP: 991 mGy-cm. mGy.



LIMITATIONS:  None.



FINDINGS:  VENTRICLES: Prominent.

CEREBRUM: No masses.  No hemorrhage.  No midline shift.  Areas of low density in the white matter mos
t likely due to chronic micro-vascular ischemic change.  No evidence for acute infarction.

CEREBELLUM: No masses.  No hemorrhage.  No alteration of density.  No evidence for acute infarction.

EXTRAAXIAL SPACES: Mild age-related involutional change.  No fluid collections.  No masses.

ORBITS AND GLOBE: No intra- or extraconal masses.  Normal contour of globe without masses.

CALVARIUM: No fracture.

PARANASAL SINUSES: No fluid or mucosal thickening.

SOFT TISSUES: No mass or hematoma.

OTHER: No other significant finding.



IMPRESSION:  MILD CHRONIC CHANGES OF ATROPHY AND MICROVASCULAR ISCHEMIA.  NO ACUTE PROCESS.

EVIDENCE OF ACUTE STROKE: NO.



COMMENT:   Pertinent positive or negative findings of the imaging study reported as a CRITICAL EXAM moose [facility] at08:45 on 8/30/2020.

Category of Critical Exam: Stroke alert.



TECHNICAL DOCUMENTATION:  JOB ID:  [id number]

Quality ID # 436: Final reports with documentation of one or more dose reduction techniques (e.g., Au
tomated exposure control, adjustment of the mA and/or kV according to patient size, use of iterative 
reconstruction technique)

 2011 [employer]- All Rights Reserved



Reading location - IP/workstation name: [email or web address]

## 2020-08-30 NOTE — RADIOLOGY REPORT (SQ)
EXAM DESCRIPTION:  CHEST SINGLE VIEW



IMAGES COMPLETED DATE/TIME:  8/30/2020 8:37 am



REASON FOR STUDY:  STROKE



COMPARISON:  8/1/2020.



EXAM PARAMETERS:  NUMBER OF VIEWS: One view.

TECHNIQUE: Single frontal radiographic view of the chest acquired.

RADIATION DOSE: NA

LIMITATIONS: None.



FINDINGS:  LUNGS AND PLEURA: No opacities, masses or pneumothorax. No pleural effusion.

MEDIASTINUM AND HILAR STRUCTURES: No masses.  Contour normal.

HEART AND VASCULAR STRUCTURES: Heart normal in size.  Normal vasculature.

BONES: No acute findings.

HARDWARE: None in the chest.

OTHER: No other significant finding.



IMPRESSION:  NO ACUTE RADIOGRAPHIC FINDING IN THE CHEST.



TECHNICAL DOCUMENTATION:  JOB ID:  [id number]

 2011 [employer]- All Rights Reserved



Reading location - IP/workstation name: [email or web address]

## 2020-08-30 NOTE — EKG REPORT
SEVERITY:- ABNORMAL ECG -

SINUS RHYTHM

FIRST DEGREE AV BLOCK

:

Confirmed by: Sammi Jarvis MD 30-Aug-2020 10:31:35

## 2020-08-30 NOTE — RADIOLOGY REPORT (SQ)
EXAM DESCRIPTION:  CTA HEAD; CTA NECK



IMAGES COMPLETED DATE/TIME:  8/30/2020 10:13 am; 8/30/2020 10:14 am



REASON FOR STUDY:  stroke symptoms



COMPARISON:  None.



TECHNIQUE:  Post IV contrast scanning, thin section axial imaging through the head and neck to evalua
te the arterial structures.  Source and MIP images are saved and reviewed on PACS.

Advanced 3D imaging as volume-rendering, MIPs, SSD performed? yes

All CT scanners at this facility use dose modulation, iterative reconstruction, and/or weight based d
osing when appropriate to reduce radiation dose to as low as reasonably achievable (ALARA).

[facility]: Dose Right  [facility]: CareDose    [facility]: Dose Right    [facility]: Teradose 4D    [facility]: Smart Technologies



RENAL FUNCTION:  BUN 18; creatinine 1.02



RADIATION DOSE:  CT Rad equipment meets quality standard of care and radiation dose reduction techniq
ues were employed. CTDIvol: 12.9 - 28.3 mGy. DLP: 529 mGy-cm.



LIMITATIONS:  None.



FINDINGS:  [place] OF LY: The anterior cerebral arteries are patent.  There is a normal anterior c
ommunicating artery.  The right A1 segment is atretic.  There is normal opacification of the right mi
ddle cerebral artery including the M1, M2, and M3 divisions.  The left A1 segment is normal.  The lef
t middle cerebral artery M1 segment and M2 segment are normally opacified.  The left M3 segment is no
t well opacified.  There is a fetal origin of the right posterior cerebral artery.  The left posterio
r cerebral artery originates from the basilar and is normal in appearance.  No left posterior communi
cating artery is seen.

POSTERIOR CIRCULATION: The distal vertebral arteries are patent as is the basilar artery. No aneurysm
.

BRAIN: No gross enhancing lesions as visualized.  The superior cerebral hemispheres are not included 
in the field of view.

BONES: Intact as visualized.

SINUSES: No fluid or mucosal thickening.

OTHER: No other significant finding.

AORTIC ARCH: Normal three-vessel origin.  Bilateral subclavian arteries are patent.  No  dissection.

RIGHT CAROTIDS: Patent common, internal and external carotid arteries without suggestion of significa
nt stenosis.  There is moderate atherosclerotic plaque at the carotid bulb.  No dissection.

RIGHT VERTEBRAL: Patent.  No dissection.

LEFT CAROTIDS: Patent common, internal and external carotid arteries without suggestion of significan
t stenosis.  There is moderate atherosclerotic plaque at the carotid bulb.  No dissection.

LEFT VERTEBRAL: Patent.  No dissection.

OTHER: 3-D  reconstructions confirm findings.



IMPRESSION:  Poor opacification of the M3 segment of the left middle cerebral artery.  This may be ch
ronic, but could relate to some watershed territory insufficiency.  Other normal variance in the intr
acranial vasculature are described above.  No dissection or aneurysm.

No significant stenosis, dissection, or aneurysm of the extracranial carotid or vertebral arteries.



COMMENT:  Findings were discussed with Dr. Kingston in the emergency department via phone at 1040 hours 
8/30/2020.



TECHNICAL DOCUMENTATION:  JOB ID:  [id number]

Quality ID # 436: Final reports with documentation of one or more dose reduction techniques (e.g., Au
tomated exposure control, adjustment of the mA and/or kV according to patient size, use of iterative 
reconstruction technique)

 2011 [employer]- All Rights Reserved



Reading location - IP/workstation name: [email or web address]

## 2020-08-30 NOTE — ER DOCUMENT REPORT
ED Neuro Symptoms/Deficit





- General


Chief Complaint: Weakness


Stated Complaint: WEAKNESS


Time Seen by Provider: 08/30/20 08:09


Primary Care Provider: 


MICHELLE MARKHAM MD [Primary Care Provider] - Follow up as needed


Notes: 





Patient is a 80-year-old male who presents emergency department with right-sided

weakness and slurred speech.  Wife states that the patient woke up not at his 

baseline.  Patient has right-sided weakness, according to the wife.  Patient has

a history of a stroke on July 29.  He was admitted here and then discharged.  He

was back the next day with difficulty breathing and then was transferred to [facility].  This past Monday, the patient was taken off 

his Eliquis.


TRAVEL OUTSIDE OF THE [place] IN LAST 30 DAYS: No





- Related Data


Allergies/Adverse Reactions: 


                                        





No Known Allergies Allergy (Verified 08/27/19 09:18)


   











Past Medical History





- General


Information source: Relative





- Social History


Smoking Status: Unknown if Ever Smoked


Family History: Reviewed & Not Pertinent, Hypertension





- Past Medical History


Cardiac Medical History: Reports: Hx Atrial Fibrillation, Hx 

Hypercholesterolemia, Hx Hypertension - CONTROLLED/MEDICATED


   Denies: Hx Coronary Artery Disease, Hx Heart Attack


Pulmonary Medical History: 


   Denies: Hx Asthma, Hx Bronchitis, Hx COPD, Hx Pneumonia


Neurological Medical History: Reports: Hx Cerebrovascular Accident.  Denies: Hx 

Seizures


Malignancy Medical History: Reports Hx Skin Cancer - Squamous cell


GI Medical History: Denies: Hx Hepatitis, Hx Hiatal Hernia, Hx Ulcer


Musculoskeletal Medical History: Denies Hx Arthritis


Psychiatric Medical History: 


   Denies: Hx Depression


Infectious Medical History: Denies: Hx Hepatitis


Past Surgical History: Reports: Other - Multiple squamous cell lesions removed. 

Bilateral cataract repair..  Denies: Hx Open Heart Surgery, Hx Pacemaker





- Immunizations


Hx Diphtheria, Pertussis, Tetanus Vaccination: No - UNK


Hx Pneumococcal Vaccination: 01/01/11





Review of Systems





- Review of Systems


-: Yes ROS unobtainable due to patient's medical condition





Physical Exam





- Notes


Notes: 





PHYSICAL EXAMINATION:





GENERAL: Appears chronically ill, no acute distress. 





HEAD:  Normocephalic, atraumatic.





EYES:  PERRL, conjunctiva normal, all extraocular movements intact, sclera 

nonicteric





SPEECH: Slurred/unintelligible.





ENT:  Moist mucous membranes. 





NECK: Supple, no noticeable swelling, redness, rash.  Normal range of motion.





LUNGS: Equal breath sounds bilaterally and clear to auscultation.  No wheezes 

rales or rhonchi.





CARDIOVASCULAR: S1-S2, regular rate, regular rhythm.  Radial pulses 2+, normal.





ABDOMEN: Normoactive bowel sounds.  Soft, nontender,  no guarding, no rebound 

tenderness, and no masses palpated.





EXTREMITIES: Right-sided weakness.  No cyanosis. 





NEUROLOGICAL: Moves all extremities upon command.  Right-sided weakness.





PSYCH: Normal mood, normal affect.





SKIN: Warm, dry.  No rash, lesions, ulcerations noted.  Normal skin turgor.





Course





- Re-evaluation


Re-evalutation: 





08/30/20 08:36


Wife is requesting that I speak with [facility], as he 

was there just a month ago.  I called [facility] and he

is on the board for possible transfer.


08/30/20 09:26


CT of the head did not show an intracranial bleed.  I spoke to [facility] again.  Awaiting callback from neurology.  I then went 

to go clarify with the wife exactly when the last known well time was and the 

wife stated that the patient woke up his "normal post stroke self at 7:00 this 

morning.  He walked to the bathroom with a walker.  When he got up from the 

bathroom he stated, 'I'm a mess' and he had worsening right-sided weakness and 

facial droop."





08/30/20 09:41


I spoke with Dr. Denis from [facility].  Patient will

be accepted to the neuro floor.  Since the patient's wife clarified that the 

patient woke up with his normal post stroke self.  Dr. Topete from neurology will 

be contacted again.  Patient is currently in MRI.


08/30/20 10:00


Dr. Hyatt and I spoke with the wife.  We reevaluated the patient and the 

patient's right arm strength is better than it was when I initially saw him.  We

have decided to hold off on TPA at this time.  Patient will be air transported 

to [facility].





08/30/20 10:24


I spoke with the radiologist here and was informed that the patient has new foci

in the left PCA territory and in an additional areas.  I then called [facility] and updated the transfer center of these findings.





- Laboratory


Result Diagrams: 


                                 08/30/20 08:45





                                 08/30/20 08:45





Critical Care Note





- Critical Care Note


Comments: 





Critical care time spent obtaining history from patient or surrogate, 

discussions with consultants, development of treatment plan with patient or 

surrogate, evaluation of patient's response to treatment, examination of 

patient, ordering and performing treatments and interventions, ordering and 

review of laboratory studies, re-evaluation of patient's condition, ordering and

review of radiographic studies and review of old charts





ED Alteplase Inc/Exc Criteria





- Inclusion Criteria:


1: Patient presented to ED within 3 hours of acute ischemic stroke symptom 

onset?


-: Yes


2: Did baseline CT exclude intracranial hemorrhage and/or other risk factors?


-: Yes


3: Is the age of the patient 18 years of age or greater?


-: Yes


*****: If any of the above questions are answered "NO" then stop, patient is not

a candidate for Alteplase,


*****: If all of the above questions are answered "YES" then continue with 

Exclusion Criteria.





- Exclusion Criteria:


1: Is there evidence of intracranial hemorrhage on baseline CT?


-: No


2: Is there suspicion of subarachnoid hemorrhage (even if CT negative)?


-: No


3: Is there a history of serious head trauma, recent previous stroke or MI 

within 3 months?


-: Yes


4: Does the patient have a clinical presentation consistent with MI or post-MI 

pericarditis?


-: No


5: Is there history of intracranial hemorrhage?


-: No


6: On repeated measurement is Systolic BP greater than 185mmHg or Diastolic BP 

greater that 110 mmHg and is aggressive treatment needed to reduce blood press

ure to these limits (e.g. constant infusion of an anti-hypertensive)?


-: No


7: Did the patient awake with stroke symptoms?


-: No


8: Has the patient had a lumbar puncture or an arterial puncture at a non-

compressile site within 7 days?


-: No


9: With in the last 14 days did the patient have surgery or major trauma?


-: No


10: Is the patient pregnant or postpartum less than 2 weeks?


-: No


11: Was there any active bleeding or acute trauma?


-: No


12: Does the patient have intracranial neoplasm, arteriovenous malformation or 

aneurysm?


-: No


13: Does the patient have abnormal glucose (less than 50 or greater than 

400mg/dl)?  Record glucose in Comment.


-: No


14: Patient has rapidly improving symptoms at the time Alteplase is to be 

Administered.


-: No


15: Does the patient have any risks for bleeding, including but not limited to:


a.: Current use of Coumadin with PT greater than 15 seconds or INR greater than 

1.7.


b.: Current use of Pradaxa (Dabigatran).


c.: Heparin administereed within the past 48 hours and PTT elevated.


d.: Platelet count less than 100,000/mm.


e.: Major surgery or serious trauma within 14 days.


f.: Gastrointestinal or gynecological urinary bleeding within 14 days.


g.: Myocardial Infarction (MI) within 3 months.


-: No


*****: If the answer to any of the above questions is "YES" then stop, the patie

nt is not a candidate for Alteplase.


*****: If the answer to all of the above questions is "NO" then the patient may 

be eligible for the Administration of Alteplase.


*****: If the patient is noted to have seizure activity at onset of Stroke 

symptoms; Consult Neurologist for further evaluation.





- The patient is:


-: Included and is eligible to receive Alteplase.  *Initiate bed placement at 

higher level of care*


--: No


Reviewed risks & benefits of thrombolytic therapy: I have reviewed the risks and

benefits of thrombolytic therapy with the patient and/or his/her family.


-: Excluded and not eligible to receive Alteplase for the above exclusions.


-: Excluded and not eligible to receive Alteplase for other reasons (specify in 

comments):





- Diagnosis of TIA:


-: Patient presented with transient symptoms that are now resolved and no other 

neurologic findings are currently present. List symptoms in comments.


-: Patient is NOT a candidate for tPA.


-:  ____(put name in comment)______ has been consulted for admission and 

continued evaluation of risk factor assessment.





Discharge





- Discharge


Clinical Impression: 


 Right sided weakness





Stroke


Qualifiers:


 CVA mechanism: unspecified Qualified Code(s): I63.9 - Cerebral infarction, 

unspecified





Condition: Fair


Disposition: [facility]


Admitting Provider: Dr. Denis


Referrals: 


MICHELLE MARKHAM MD [Primary Care Provider] - Follow up as needed

## 2020-08-30 NOTE — ER DOCUMENT REPORT
Doctor's Note


Notes: 





08/30/20 10:12


Has been discussed with Praveena HENNESSY.  I went in and evaluated the patient and 

obtain information from his wife.  Wife states that 7 AM they woke up, patient 

then was able to ambulate to the restroom and when he came out of the restroom 

stated that he was feeling "messed up", this is how he refers to his condition. 

She was concerned that his known residual deficits were worse, she does report 

that he has had an up-and-down course, some days he seems worse, others he seems

better.  He suffered a CVA at the end of July, he has residual aphasia and 

right-sided weakness.  He did not receive TPA during the initial stroke.  On 

exam patient does have clear speech, does have some slight down turning of the 

right corner of the mouth which wife reports is chronic, his tongue protrudes 

midline.  He is able to raise his right arm over his head and he a strong right 

hand grasp.  He has been accepted in transfer to [facility].  I do not consider him to be a TPA candidate at this time given his exam

and the timing.

## 2020-12-31 NOTE — ER RDC ASSESSMENT REPORT
Intake





- In the Last 14 days


Have you traveled outside [place]?: No


Have you been in close contact with someone CONFIRMED: Yes


Worked in Healthcare?: No





- Symptoms


Subjective Fever(Felt feverish): Yes


Chills: Yes


Muscule Aches: No


Runny Nose: No


Sore Throat: Yes


Cough (New or worsening chronic cough): Yes


Shortness of breath: Yes


Nausea or Vomiting: No


Headache: No


Abdominal Pain: No


Diarrhea(3 or more loose stools in last 24 hours): No





- Do you have any of the following


Chronic lung disease: Asthma or emphysema or COPD: No


Cystic Fibrosis: No


Diabetes: No


High Blood Pressure: Yes


Cardiovascular Disease: Yes


Chronic Kidney Disease: No


Chronic Liver Disease: No


Chronic blood disorder like Sickle Cell Disease: No


Weak immune system due to disease or medication: No


Neurologic condition that limits movement: No


Developmental delay - Moderate to Severe: No


Recent (within past 2 weeks) or current Pregnancy: No


Morbid Obesity (>100 pounds over ideal weight): No


Obesity Comment: 





Height 5 feet 7 inches weight 150 pounds


Other Comment: 





Patient has history of CVA and aphasia.  Patient is able to follow commands 

unable to speak.  Son is power of  and able to speak on patient's 

behalf.





- Objective


Temperature: 98.1 F


Pulse Rate: 60


Respiratory Rate: 16


Blood Pressure: 108/62


O2 Sat by Pulse Oximetry: 95


Objective: 


Given above, testing performed: 
































If Testing Performed:


Test Specimen Type Sent to











General





- General


Information source: Patient, POA - Power of 


Notes: 





Patient here at [facility] for Covid testing son speaking on behalf of patient who has 

dysphagia as a result of CVA.  Patient's son states and had exposure to friend 

who has tested positive patient has presented with symptoms in the last few days

and include fever chills sore throat cough shortness of breath.  Patient's PCP 

is Dr. APRIL Magaña and was not able to get up with him today son reports was 

thinking maybe he needed a chest x-ray.  Patient will continue to follow-up with

PCP later today.





- Related Data


Allergies/Adverse Reactions: 


                                        





No Known Allergies Allergy (Verified 08/27/19 09:18)


   











Past Medical History





- General


Information source: Patient, POA - Power of 





- Social History


Smoking Status: Never Smoker


Family History: Reviewed & Not Pertinent, Hypertension





- Past Medical History


Cardiac Medical History: Reports: Hx Atrial Fibrillation, Hx 

Hypercholesterolemia, Hx Hypertension - CONTROLLED/MEDICATED


   Denies: Hx Coronary Artery Disease, Hx Heart Attack


Pulmonary Medical History: 


   Denies: Hx Asthma, Hx Bronchitis, Hx COPD, Hx Pneumonia


Neurological Medical History: Reports: Hx Cerebrovascular Accident.  Denies: Hx 

Seizures


Malignancy Medical History: Reports Hx Skin Cancer - Squamous cell


GI Medical History: Denies: Hx Hepatitis, Hx Hiatal Hernia, Hx Ulcer


Musculoskeletal Medical History: Denies Hx Arthritis


Psychiatric Medical History: 


   Denies: Hx Depression


Infectious Medical History: Denies: Hx Hepatitis


Past Surgical History: Reports: Other - Multiple squamous cell lesions removed. 

Bilateral cataract repair..  Denies: Hx Open Heart Surgery, Hx Pacemaker





Physical Exam





- General


General appearance: Appears well, Alert


In distress: None


Notes: 


PHYSICAL EXAMINATION: 


GENERAL: Well-appearing and in no acute distress. 


HEAD: Atraumatic, normocephalic. 


EYES: sclera anicteric, conjunctiva are normal. 


ENT: nares patent. Moist mucous membranes. 


NECK: Normal range of motion, supple without lymphadenopathy 


LUNGS: CTAB and equal. No wheezes rales or rhonchi.  Respirations even and 

unlabored lung sounds clear.


HEART: Regular rate and rhythm without murmurs 


ABDOMEN: Soft, nontender, normal bowel sounds, no guarding. 


EXTREMITIES: Normal range of motion, no pitting edema. No cyanosis. 


NEUROLOGICAL: Cranial nerves grossly intact. aphasia.


PSYCH: Normal mood, normal affect. 


SKIN: Warm, Dry, normal turgor, no rashes or lesions noted








Diagnostic Results


Laboratory Results: 


Pending strep culture.  Pending Covid testing results.  Patient and son provided

instructions regarding Covid to include: As a person under investigation for 

Covid 19, the [place] department of Health and Human Services, division 

of public health advises you to adhere to the following guidance until your test

results are reported to you.  If your test result is positive, you will receive 

additional information from your provider and your local health department at 

that time.


 


Remain at home until you are cleared by the health provider or public health 

authorities.


 


Keep a log of visitors to your home, notify any visitors to your home of your 

isolation status.


 


If you plan to move to a new address or leave the [facility], notify the local 

health department in your County.


 


Call your doctor or seek care if you have an urgent medical need.  Before 

seeking medical care, call ahead to get instructions from the provider before 

arriving at the medical office clinic or hospital.  Notify them that you are 

being tested for the virus that causes Covid 19 so that arrangements can be 

made, as necessary, to prevent transmission to others in the healthcare setting.

 Next, notify the local health department in your county.


 


If a medical emergency arises and you need to call 911, inform the first 

responders that you are being tested for the virus that causes Covid 19.  Next, 

notify the local health department in your county.





Patient Education/Counseling


Counseling/Education: 





Patient presents with upper respiratory symptoms worrisome for possible Covid 

19.  Patient does not have emergency worring symptoms such as difficulty 

breathing, shortness of breath, chest pain, pressure, confusion or cyanosis.  

Patient appears suitable for discharge.  Patient and son instructed to follow-up

with PCP today.  To ED for persistent or worsening symptoms.  Patient's vital 

signs are stable and patient is nontoxic in appearance.  Good return precautions

have been discussed with patient, patient verbalized understanding and is 

agreeable with discharge plan of care at this time.





RDC Discharge





- Discharge


Clinical Impression: 


 Encounter for screening laboratory testing for COVID-19 virus





Upper respiratory infection


Qualifiers:


 URI type: unspecified URI Qualified Code(s): J06.9 - Acute upper respiratory 

infection, unspecified





Condition: Stable


Disposition: Home; Selfcare

## 2021-01-04 NOTE — ER DOCUMENT REPORT
Entered by ZABRINA WELSH SCRIBE  21 1234 





Acting as scribe for:CANDIS HERNADEZ MD





ED General





- General


Mode of Arrival: Medic


Information source: Emergency Med Personnel


Cannot obtain history due to: Altered mental status


TRAVEL OUTSIDE OF THE [place] IN LAST 30 DAYS: No





<CANDIS HERNADEZ - Last Filed: 21 20:58>





<MARCIA MACEDO - Last Filed: 21 16:07>





- General


Chief Complaint: Altered Mental Status


Stated Complaint: AMS


Primary Care Provider: 


MICHELLE MARKHAM MD [Primary Care Provider] - Follow up as needed


Notes: 





This 80 year old old male patient with a history of CVA with residual aphasia 

and right-sided deficits presents to the ED today via EMS for evaluation of 

altered mental status. Per EMS, family reports that the patient has been more 

altered than usual with increased confusion and aggression for the last x2 days 

along with diarrhea. Per [facility] records, patient tested positive for COVID on 

at the [facility]. Nursing reports that the patient became combative with EMS, 

so they administered Versed 2.5 mg IM. Patient is unable to provide any 

reasonable history and responds with "I don't know" for every question, 

therefore, HPI is limited and ROS is unobtainable.


 (CANDIS HERNADEZ)





- Related Data


Allergies/Adverse Reactions: 


                                        





No Known Allergies Allergy (Verified 19 09:18)


   











Past Medical History





- General


Information source: [facility] Records


Cannot obtain history due to: Altered mental status





- Social History


Smoking Status: Unknown if Ever Smoked


Family History: Reviewed & Not Pertinent, Hypertension





- Past Medical History


Cardiac Medical History: Reports: Hx Atrial Fibrillation, Hx 

Hypercholesterolemia, Hx Hypertension - CONTROLLED/MEDICATED


Neurological Medical History: Reports: Hx Cerebrovascular Accident


Malignancy Medical History: Reports Hx Skin Cancer - Squamous cell


Past Surgical History: Reports: Other - Multiple squamous cell lesions removed. 

Bilateral cataract repair.





- Immunizations


Hx Diphtheria, Pertussis, Tetanus Vaccination: No - UNK


Hx Pneumococcal Vaccination: 11





<CANDIS HERNADEZ - Last Filed: 21 20:58>





Review of Systems





- Review of Systems


-: Yes ROS unobtainable due to patient's medical condition - Altered mental 

status





<CANDIS HERNADEZ - Last Filed: 21 20:58>





Physical Exam





- General


General appearance: Combative, Other - Appears ill with moderate to severe 

confusion and agitation


In distress: None





- HEENT


Head: Normocephalic, Atraumatic


Eyes: Normal


Extraocular movements intact: Yes


Pupils: PERRL


Pharynx: Normal.  No: Erythema


Neck: Normal, Supple





- Respiratory


Respiratory status: No respiratory distress, Tachypnea, Other - 99% O2 sats on 

room air


Chest status: Nontender


Breath sounds: Normal


Chest palpation: Normal





- Cardiovascular


Rhythm: Regular


Heart sounds: Normal auscultation


Murmur: No





- Abdominal


Inspection: Normal


Distension: No distension


Bowel sounds: Normal


Tenderness: Nontender - Abdomen soft


Organomegaly: No organomegaly





- Back


Back: Normal, Nontender





- Extremities


General upper extremity: Normal inspection


General lower extremity: Normal inspection.  No: Edema





- Neurological


Cognition: Confused


[place] Coma Scale Eye Opening: Spontaneous


[place] Coma Scale Verbal: Confused


[place] Coma Scale Motor: Obeys Commands


[place] Coma Scale Total: 14


Cranial nerves: Other - Patient opens his mouth when asked to smile


Additional motor exam normals: Other - Patient is able to lift and hold left 

lower leg up for about 4 seconds before it drifts down. He can barely lift the 

right lower leg..  No: Equal  - Right hand  is 1+, Left hand is 4+





- Psychological


Associated symptoms: Agitated, Combative, Confused





- Skin


Skin Temperature: Warm


Skin Moisture: Dry


Skin Color: Normal





<CANDIS HERNADEZ - Last Filed: 21 20:58>





- Vital signs


Vitals: 





                                        











Pulse Ox


 


 96 


 


 21 11:24














Course





- Laboratory Results


Result Diagrams: 


                                 21 16:14





                                 21 11:38


Critical Laboratory Results Reviewed: Yes


Attending or Supervising Physician who Reviewed Labs: CANDIS HERNADEZ - Elevated

D-dimer and a white blood cell count of 2.2





- Radiology Results


Critical Radiology Results Reviewed: Yes


Attending or Supervising Physician who Reviewed Radiology: CANDIS HERNADEZ - 

Evidence of an acute stroke with tiny embolic emboli infarcts noted in the left 

parietal posterior lobe region.





<CANDIS HERNADEZ - Last Filed: 21 20:58>





- Laboratory Results


Result Diagrams: 


                                 21 16:14





                                 21 11:38





<MARCIA MACEDO - Last Filed: 21 16:07>





- Re-evaluation


Re-evalutation: 





21 13:37


After IV lorazepam 1 mg patient is resting more comfortable at this time 

allowing staff to complete her tasks and working him up at this time.


21 19:45


Patient resting comfortably in room at this time blood pressure is 135/61 heart 

rate nontachycardic and patient not showing any signs of distress.  Patient has 

been medicated for the MRI/MRA exam that he has had so that he would remain 

still so the prevent motion artifact.


21 20:52


Discussed with patient son Amor Chapman telephone #[phone].  We discussed the 

work-up that has been done today on his dad which did not disclose that patient 

has


COVID-19 positive test (U07.1, COVID-19) with Acute Pneumonia (J12.89, Other 

viral pneumonia)


(If respiratory failure or sepsis present, add as separate assessment)





.  Patient is seriously ill inasmuch as he has Covid pneumonia at age 80 in the 

last couple of days he has been becoming weaker and agitated and somewhat 

uncooperative on arrival in our department.  Required medication to sedate 

patient in order to do the work-up to determine what the condition and what the 

problems are.  Patient has had a new stroke but these are tiny lacunar type 

infarcts in the left posterior parietal lobe.  There is no new significant 

finding in terms of neurological deficit other than what patient currently has 

which is right-sided weakness and speech impediment.  Patient also is 

significantly depressed of his life condition at this time and the fact that his

wife recently  of Covid 19 and lung cancer.  Patient lives with his son who 

takes care of him on a daily basis and his son Mr. Drew Russell noted that 

his dad was worsening since the COVID-19 diagnosis 3 days ago.


I presented his that his case to the hospitalist for admission to the hospital 

and patient did not meet inpatient criteria therefore patient was not admitted 

to the hospital.  Again discussed with Mr. Amor Russell regarding his that did not

meet criteria to be admitted to the hospital and there was really no strong 

indication that he needed to be transferred to any other hospital.  I 

recommended that we could put his that on a social hold if he was unable to come

pick his that up and take him home Mr. Amor Russell stated that he preferred not 

to come and take his that home but not and have to bring him right back if there

is a problem recommend that if we could find a way to observe him overnight and 

that reassessment in the morning with some case management input for what they 

are future nursing needs may be would be a better plan.  I discussed this with 

our assistant medical director Dr. Matthews and she agreed that patient could be 

placed in a social hold position in the emergency department and we we consult 

case management and have a discussion in the morning regarding disposition of 

patient and returning home with nursing support if indicated. (CANDIS HERNADEZ)





- Vital Signs


Vital signs: 


                                        











Temp Pulse Resp BP Pulse Ox


 


 97.6 F   57 L  20   127/75 H  92 


 


 21 12:15  21 09:00  21 14:01  21 14:01  21 14:01














- Laboratory Results


Laboratory Results Interpreted: 


                                        











  21





  11:38 11:38 16:14


 


WBC    2.2 L


 


RDW    14.2 H


 


Mono % (Auto)    19.8 H


 


Absolute Neuts (auto)    0.9 L


 


Seg Neutrophils %    41.8 L


 


D-Dimer   1.38 H 


 


Sodium  131.5 L  


 


Total Protein  5.7 L  


 


Albumin  2.9 L  











Laboratories significant for a D-dimer of 1.38 and a white blood cell count of 

2.2


21 19:36





                               Labs- Entire Visit











  21





  11:38 11:38 11:38


 


WBC  Cancelled  


 


RBC  Cancelled  


 


Hgb  Cancelled  


 


Hct  Cancelled  


 


MCV  Cancelled  


 


MCH  Cancelled  


 


MCHC  Cancelled  


 


RDW  Cancelled  


 


Plt Count  Cancelled  


 


Lymph % (Auto)  Cancelled  


 


Mono % (Auto)  Cancelled  


 


Eos % (Auto)  Cancelled  


 


Baso % (Auto)  Cancelled  


 


Absolute Neuts (auto)  Cancelled  


 


Absolute Lymphs (auto)  Cancelled  


 


Absolute Monos (auto)  Cancelled  


 


Absolute Eos (auto)  Cancelled  


 


Absolute Basos (auto)  Cancelled  


 


Seg Neutrophils %  Cancelled  


 


Platelet Estimate  Cancelled  


 


PT    14.2


 


INR    1.08


 


D-Dimer    1.38 H


 


Sodium   131.5 L 


 


Potassium   4.1 


 


Chloride   103 


 


Carbon Dioxide   24 


 


Anion Gap   5 


 


BUN   13 


 


Creatinine   0.72 


 


Est GFR ( Amer)   > 60 


 


Est GFR (MDRD) Non-Af   > 60 


 


Glucose   109 


 


Lactic Acid   


 


Calcium   8.7 


 


Magnesium   1.7 


 


Total Bilirubin   0.8 


 


Direct Bilirubin   0.2 


 


Neonat Total Bilirubin   Not Reportable 


 


Neonat Direct Bilirubin   Not Reportable 


 


Neonat Indirect Bili   Not Reportable 


 


AST   34 


 


ALT   42 


 


Alkaline Phosphatase   70 


 


Troponin I   


 


NT-Pro-B Natriuret Pep   


 


Total Protein   5.7 L 


 


Albumin   2.9 L 


 


Urine Color   


 


Urine Appearance   


 


Urine pH   


 


Ur Specific Gravity   


 


Urine Protein   


 


Urine Glucose (UA)   


 


Urine Ketones   


 


Urine Blood   


 


Urine Nitrite   


 


Urine Bilirubin   


 


Urine Urobilinogen   


 


Ur Leukocyte Esterase   


 


Urine WBC (Auto)   


 


Urine RBC (Auto)   


 


Urine Mucus (Auto)   


 


Urine Ascorbic Acid   


 


Urine Opiates Screen   


 


Urine Methadone Screen   


 


Ur Barbiturates Screen   


 


Ur Phencyclidine Scrn   


 


Ur Amphetamines Screen   


 


U Benzodiazepines Scrn   


 


Urine Cocaine Screen   


 


U Marijuana (THC) Screen   


 


Serum Alcohol   < 10 


 


Slides for Path Review  Cancelled  














  21





  11:38 13:57 13:57


 


WBC   Cancelled 


 


RBC   Cancelled 


 


Hgb   Cancelled 


 


Hct   Cancelled 


 


MCV   Cancelled 


 


MCH   Cancelled 


 


MCHC   Cancelled 


 


RDW   Cancelled 


 


Plt Count   Cancelled 


 


Lymph % (Auto)   Cancelled 


 


Mono % (Auto)   Cancelled 


 


Eos % (Auto)   Cancelled 


 


Baso % (Auto)   Cancelled 


 


Absolute Neuts (auto)   Cancelled 


 


Absolute Lymphs (auto)   Cancelled 


 


Absolute Monos (auto)   Cancelled 


 


Absolute Eos (auto)   Cancelled 


 


Absolute Basos (auto)   Cancelled 


 


Seg Neutrophils %   Cancelled 


 


Platelet Estimate   Cancelled 


 


PT   


 


INR   


 


D-Dimer   


 


Sodium   


 


Potassium   


 


Chloride   


 


Carbon Dioxide   


 


Anion Gap   


 


BUN   


 


Creatinine   


 


Est GFR ( Amer)   


 


Est GFR (MDRD) Non-Af   


 


Glucose   


 


Lactic Acid    1.1


 


Calcium   


 


Magnesium   


 


Total Bilirubin   


 


Direct Bilirubin   


 


Neonat Total Bilirubin   


 


Neonat Direct Bilirubin   


 


Neonat Indirect Bili   


 


AST   


 


ALT   


 


Alkaline Phosphatase   


 


Troponin I  < 0.012  


 


NT-Pro-B Natriuret Pep  62  


 


Total Protein   


 


Albumin   


 


Urine Color   


 


Urine Appearance   


 


Urine pH   


 


Ur Specific Gravity   


 


Urine Protein   


 


Urine Glucose (UA)   


 


Urine Ketones   


 


Urine Blood   


 


Urine Nitrite   


 


Urine Bilirubin   


 


Urine Urobilinogen   


 


Ur Leukocyte Esterase   


 


Urine WBC (Auto)   


 


Urine RBC (Auto)   


 


Urine Mucus (Auto)   


 


Urine Ascorbic Acid   


 


Urine Opiates Screen   


 


Urine Methadone Screen   


 


Ur Barbiturates Screen   


 


Ur Phencyclidine Scrn   


 


Ur Amphetamines Screen   


 


U Benzodiazepines Scrn   


 


Urine Cocaine Screen   


 


U Marijuana (THC) Screen   


 


Serum Alcohol   


 


Slides for Path Review   Cancelled 














  21





  16:14 17:40 17:40


 


WBC  2.2 L  


 


RBC  5.41  


 


Hgb  15.4  


 


Hct  44.2  


 


MCV  82  


 


MCH  28.5  


 


MCHC  34.8  


 


RDW  14.2 H  


 


Plt Count    


 


Lymph % (Auto)  37.2  


 


Mono % (Auto)  19.8 H  


 


Eos % (Auto)  0.4  


 


Baso % (Auto)  0.8  


 


Absolute Neuts (auto)  0.9 L  


 


Absolute Lymphs (auto)  0.8  


 


Absolute Monos (auto)  0.4  


 


Absolute Eos (auto)  0.0  


 


Absolute Basos (auto)  0.0  


 


Seg Neutrophils %  41.8 L  


 


Platelet Estimate   


 


PT   


 


INR   


 


D-Dimer   


 


Sodium   


 


Potassium   


 


Chloride   


 


Carbon Dioxide   


 


Anion Gap   


 


BUN   


 


Creatinine   


 


Est GFR ( Amer)   


 


Est GFR (MDRD) Non-Af   


 


Glucose   


 


Lactic Acid   


 


Calcium   


 


Magnesium   


 


Total Bilirubin   


 


Direct Bilirubin   


 


Neonat Total Bilirubin   


 


Neonat Direct Bilirubin   


 


Neonat Indirect Bili   


 


AST   


 


ALT   


 


Alkaline Phosphatase   


 


Troponin I   


 


NT-Pro-B Natriuret Pep   


 


Total Protein   


 


Albumin   


 


Urine Color   YELLOW 


 


Urine Appearance   CLEAR 


 


Urine pH   7.0 


 


Ur Specific [place]   1.011 


 


Urine Protein   NEGATIVE 


 


Urine Glucose (UA)   NEGATIVE 


 


Urine Ketones   NEGATIVE 


 


Urine Blood   NEGATIVE 


 


Urine Nitrite   NEGATIVE 


 


Urine Bilirubin   NEGATIVE 


 


Urine Urobilinogen   NEGATIVE 


 


Ur Leukocyte Esterase   NEGATIVE 


 


Urine WBC (Auto)   0 


 


Urine RBC (Auto)   1 


 


Urine Mucus (Auto)   RARE 


 


Urine Ascorbic Acid   NEGATIVE 


 


Urine Opiates Screen    NEGATIVE


 


Urine Methadone Screen    NEGATIVE


 


Ur Barbiturates Screen    NEGATIVE


 


Ur Phencyclidine Scrn    NEGATIVE


 


Ur Amphetamines Screen    NEGATIVE


 


U Benzodiazepines Scrn    UNCONFIRMED POSITIVE


 


Urine Cocaine Screen    NEGATIVE


 


U Marijuana (THC) Screen    NEGATIVE


 


Serum Alcohol   


 


Slides for Path Review   








 (CANDIS HERNADEZ)





- Radiology Results


Radiology Results Interpreted: 





21 13:37


Chest x-ray single view portable shows bibasilar infiltrates which may be 

related to his COVID-19 status.


21 19:38





                                        





Chest X-Ray  21 12:58


IMPRESSION:  NO ACUTE RADIOGRAPHIC FINDING IN THE CHEST.


 








Chest/Abdomen CTA  21 14:18


IMPRESSION:  Basilar airspace disease most consistent with atelectasis.  No 

pulmonary emboli.


 








Head CT  21 16:02


IMPRESSION:  Large left MCA infarct as discussed.  Recommend MRI to exclude 

acute extension.  Scattered periventricular and subcortical white matter changes

consistent with chronic small vessel disease.


EVIDENCE OF ACUTE STROKE: NO.


 








Head MRI  21 17:14


IMPRESSION:  There are a couple of tiny embolic infarctions in the left 

posterior parietal lobe.  There is an old left middle cerebral artery 

infarction.


EVIDENCE OF ACUTE STROKE: NO.   Yes


 








Brain MRI with MRA  21 17:17


IMPRESSION:  Absence versus occlusion of the A1 segment on the right with a 

patent anterior communicating artery.  The vessels of the [place] are 

otherwise unremarkable.


 








Head CT shows large MCA infarct that is old.  MRI recommended.  Scattered 

changes of chronic small vessel disease evidence of acute stroke no.





Head MRI scan shows tiny embolic infarctions in the left posterior parietal 

lobe.





 old left middle cerebral cerebral artery territory infarction evidence of acute

stroke yes of the left posterior part parietal lobe.





Brain MRI with MRA shows absence versus occlusion of A1 segment of the right 

with a patent anterior communicating artery otherwise the vessels of the [place] 

[place] are unremarkable





Chest CT scan shows no evidence of pulmonary emboli and atelectasis otherwise no

acute process


Chest x-ray shows no acute process.


 (CANDIS HERNADEZ)





- EKG Interpretation by Me


Additional EKG results interpreted by me: 





21 13:38


Twelve-lead EKG shows sinus bradycardia rate of 51 normal axis artifact present 

noted on this EKG CA interval is prolonged at 2.8 QRS within normal range no 

evidence for STEMI .probable left atrial abnormality QT interval within normal 

range (CANDIS HERNADEZ)





Discharge





<CANDIS HERNADEZ - Last Filed: 21 20:58>





<MARCIA MACEDO - Last Filed: 21 16:07>





- Discharge


Clinical Impression: 


 Lab test positive for detection of COVID-19 virus, Altered mental status, 

Leukopenia, CVA (cerebral vascular accident)





Condition: Stable


Instructions:  COVID-19 Guidance for Persons Under Investigation


Additional Instructions: 


Please follow-up with your family doctor.  Make sure you are staying hydrated.  

Please return to the ER immediately for any worsening symptoms such as shortness

of breath, fever, any other concerning signs.


Referrals: 


MICHELLE MARKHAM MD [Primary Care Provider] - Follow up in 3-5 days





ED NIH Stroke Scale





I personally performed the services described in the documentation, reviewed and

edited the documentation which was dictated to the scribe in my presence, and it

accurately records my words and actions.

## 2021-01-04 NOTE — RADIOLOGY REPORT (SQ)
EXAM DESCRIPTION:  CHEST SINGLE VIEW



IMAGES COMPLETED DATE/TIME:  1/4/2021 1:31 pm



REASON FOR STUDY:  AMS/ COVID POSITIVE



COMPARISON:  8/30/2020



EXAM PARAMETERS:  NUMBER OF VIEWS: One view.

TECHNIQUE: Single frontal radiographic view of the chest acquired.

RADIATION DOSE: NA

LIMITATIONS: None.



FINDINGS:  LUNGS AND PLEURA: No opacities, masses or pneumothorax. No pleural effusion.

MEDIASTINUM AND HILAR STRUCTURES: No masses.  Contour normal.

HEART AND VASCULAR STRUCTURES: Heart normal in size.  Normal vasculature.

BONES: No acute findings.

HARDWARE: Loop recorder overlies the left hemithorax.

OTHER: No other significant finding.



IMPRESSION:  NO ACUTE RADIOGRAPHIC FINDING IN THE CHEST.



TECHNICAL DOCUMENTATION:  JOB ID:  [id number]

 2011 [employer]- All Rights Reserved



Reading location - IP/workstation name: [email or web address]

## 2021-01-04 NOTE — RADIOLOGY REPORT (SQ)
EXAM DESCRIPTION:  CTA CHEST



IMAGES COMPLETED DATE/TIME:  1/4/2021 3:40 pm



REASON FOR STUDY:  sobr/covid positive



COMPARISON:  None.



TECHNIQUE:  CT scan of the chest performed using helical scanning technique with dynamic intravenous 
contrast injection.  Images reviewed with lung, soft tissue and bone windows.  Reconstructed coronal 
and sagittal MPR images reviewed.

Additional 3 dimensional post-processing performed to develop Maximal Intensity Projection images (MI
P).  All images stored on PACS.

All CT scanners at this facility use dose modulation, iterative reconstruction, and/or weight based d
osing when appropriate to reduce radiation dose to as low as reasonably achievable (ALARA).

[facility]: Dose Right  [facility]: CareDose    [facility]: Dose Right    [facility]: Teradose 4D    [facility]: Smart Technologies



CONTRAST TYPE AND DOSE:  contrast/concentration: Isovue 350.00 mmol/ml; Total Contrast Delivered: 65.
0 ml; Total Saline Delivered: 80.0 ml

Contrast bolus optimized for the pulmonary arteries. Not diagnostic for the aorta.



RENAL FUNCTION:  BUN 13, creatinine 0.72



RADIATION DOSE:  CT Rad equipment meets quality standard of care and radiation dose reduction techniq
ues were employed. CTDIvol: 11.3 - 14.2 mGy. DLP: 564 mGy-cm. .



LIMITATIONS:  None.



FINDINGS:  LUNGS AND PLEURA: Minimal basilar airspace disease most consistent with atelectasis.  No f
ocal consolidation.  No effusions.

AORTA AND GREAT VESSELS: No aneurysm.  Contrast bolus not optimized for the aorta.

HEART: No pericardial effusion. Moderate coronary artery calcification.

PULMONARY ARTERIES: No emboli visualized in the main pulmonary arteries or the segmental branches.

HILAR AND MEDIASTINAL STRUCTURES: No identified masses or abnormal nodes.

HARDWARE: None in the chest.

UPPER ABDOMEN: No significant findings.  Limited exam.

THYROID AND OTHER SOFT TISSUES: No masses.  No adenopathy.

BONES: No acute or significant finding.

3D MIPS: Confirm above findings.

OTHER: No other significant finding.



IMPRESSION:  Basilar airspace disease most consistent with atelectasis.  No pulmonary emboli.



COMMENT:  Quality ID # 436: Final reports with documentation of one or more dose reduction techniques
 (e.g., Automated exposure control, adjustment of the mA and/or kV according to patient size, use of 
iterative reconstruction technique)



TECHNICAL DOCUMENTATION:  JOB ID:  [id number]

 [employer]- All Rights Reserved



Reading location - IP/workstation name: [email or web address]

## 2021-01-04 NOTE — EKG REPORT
SEVERITY:- ABNORMAL ECG -

SINUS RHYTHM

FIRST DEGREE AV BLOCK

PROBABLE LEFT ATRIAL ABNORMALITY

:

Confirmed by: Mayte Miller 04-Jan-2021 14:47:51

## 2021-01-04 NOTE — RADIOLOGY REPORT (SQ)
EXAM DESCRIPTION:  CT HEAD WITHOUT



IMAGES COMPLETED DATE/TIME:  1/4/2021 4:33 pm



REASON FOR STUDY:  altered mental status



COMPARISON:  MRI BRAIN DATED 8/30/2020



TECHNIQUE:  Axial images acquired through the brain without intravenous contrast.  Images reviewed wi
th bone, brain and subdural windows.  Additional sagittal and coronal reconstructions were generated.
 Images stored on PACS.

All CT scanners at this facility use dose modulation, iterative reconstruction, and/or weight based d
osing when appropriate to reduce radiation dose to as low as reasonably achievable (ALARA).

[facility]: Dose Right  [facility]: CareDose    [facility]: Dose Right    [facility]: Teradose 4D    [facility]: Smart Technologies



RADIATION DOSE:  CT Rad equipment meets quality standard of care and radiation dose reduction techniq
ues were employed. CTDIvol: 53.2 mGy. DLP: 1017 mGy-cm. mGy.



LIMITATIONS:  None.



FINDINGS:  VENTRICLES: Stable in size and configuration.

CEREBRUM: Numerous periventricular and subcortical white matter lesions consistent with small vessel 
disease. Large area of encephalomalacia in the left posterior frontal and parietal lobe consistent wi
th infarct which has a ball from prior exam.  There is decreased attenuation in the left upper lobe. 
 Suspect this is chronic as well related to you have and in August but correlation with MRI is recomm
ended.  It has possible the patient is extending the infarct.  No focal mass or intracranial hemorrha
ge.

CEREBELLUM: No masses.  No hemorrhage.  No alteration of density.  No evidence for acute infarction.

EXTRAAXIAL SPACES: No fluid collections.  No masses.

ORBITS AND GLOBE: No intra- or extraconal masses.  Normal contour of globe without masses.

CALVARIUM: No fracture.

PARANASAL SINUSES: No fluid or mucosal thickening.

SOFT TISSUES: No mass or hematoma.

OTHER: High attenuation within the vascular structures is most likely related to recent CTA of the 
est.



IMPRESSION:  Large left MCA infarct as discussed.  Recommend MRI to exclude acute extension.  Scatter
ed periventricular and subcortical white matter changes consistent with chronic small vessel disease.


EVIDENCE OF ACUTE STROKE: NO.



COMMENT:  Quality ID # 436: Final reports with documentation of one or more dose reduction techniques
 (e.g., Automated exposure control, adjustment of the mA and/or kV according to patient size, use of 
iterative reconstruction technique)



TECHNICAL DOCUMENTATION:  JOB ID:  [id number]

 2011 [email or web address]- All Rights Reserved



Reading location - IP/workstation name: [email or web address]

## 2021-01-04 NOTE — RADIOLOGY REPORT (SQ)
EXAM DESCRIPTION:  MRA HEAD WITHOUT



IMAGES COMPLETED DATE/TIME:  1/4/2021 6:40 pm



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



COMPARISON:  MRI 8/30/2020 MRI 1/4/2021



TECHNIQUE:  Axial 3-D time-of-flight acquisition imaging performed through the brain in the area of t
he [place].  Images reformatted using 3-D MIPS.



LIMITATIONS:  None.



FINDINGS:  SOURCE IMAGES: No unexpected findings on source images.  No large masses.

3-D MIP: There is apparent absence or occlusion of the A1 segment on the right with patent anterior c
ommunicating artery.

OTHER: No other significant finding.



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
ting artery.  The vessels of the [place] are otherwise unremarkable.



TECHNICAL DOCUMENTATION:  JOB ID:  [id number]

 2011 [employer]- All Rights Reserved



Reading location - IP/workstation name: [email or web address]

## 2021-01-04 NOTE — RADIOLOGY REPORT (SQ)
EXAM DESCRIPTION:  MRI HEAD WITHOUT



IMAGES COMPLETED DATE/TIME:  1/4/2021 6:40 pm



REASON FOR STUDY:  ams/ CThead with MCA territory infarct



COMPARISON:  MR 8/30/2020 CT 1/4/2021



TECHNIQUE:  Multiplanar imaging includes non-contrasted T1, T2, FLAIR, and diffusion with ADC map seq
uences. Images stored on PACS.



LIMITATIONS:  None.



FINDINGS:  ANATOMY: No anomalies. Normal vascular flow voids. Pituitary fossa normal.

CSF SPACES: Normal in size and contour. No hemorrhage.

CEREBRUM: Sulci and gyri normal in size and contour. Normal white matter signal on FLAIR imaging.  Ol
d left MCA infarction.  No evidence of hemorrhage, mass, or extraaxial fluid collection.

POSTERIOR FOSSA: No signal alteration. No hemorrhage. No edema, masses or mass effect.  Internal zayda
tory canals, cerebello-pontine angles, mastoids normal.

DIFFUSION IMAGING: There are couple of tiny areas of abnormally restricted diffusion in the left post
erior parietal area.

ORBITS: No masses. Globes normal.

PARANASAL  SINUSES: No fluid levels.  Mucosa normal.

OTHER: No other significant finding.



IMPRESSION:  There are a couple of tiny embolic infarctions in the left posterior parietal lobe.  The
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

EVIDENCE OF ACUTE STROKE: NO.   Yes



COMMENT:   Pertinent findings on the imaging study reported as a CRITICAL RESULT to CANDIS HERNADEZ MD 
at18:51 on 1/4/2021.

Category of Critical Result: Infarctions.



TECHNICAL DOCUMENTATION:  JOB ID:  [id number]

 2011 [employer]- All Rights Reserved



Reading location - IP/workstation name: [email or web address]

## 2021-01-05 NOTE — ER DOCUMENT REPORT
Doctor's Note


Notes: 





01/05/21 14:51


Patient is a social hold.  I rounded on him this morning.  Patient is awake and 

alert.  His vitals are stable.  From reports, patient has COVID-19 as well as a 

new small stroke on top of an older previous stroke.  From record review, 

patient was evaluated by the hospitalist.  He did not believe he needed adm

ission for this as he is already medically optimized.  Family wanted to know if 

they could have any other resources for him at home.  Social work is currently 

working on home health.





On exam, patient is awake.  He is alert.  Vitals are within normal limits.  He 

is on room air.  Clear lung sounds bilaterally.  Abdomen is soft.  Moves all 

extremities.  Some residual right-sided weakness is noted which is chronic.  

Patient has expressive aphasia which I am told is not new and has been there his

whole ER stay and from his old stroke.





Social work discussed with family.  They will pick him up.  They have optimized 

his outpatient treatment and home health care.  Patient will be discharged with 

strict return precautions.  His vitals are normal at discharge.


01/05/21 19:40